# Patient Record
Sex: FEMALE | Race: WHITE | NOT HISPANIC OR LATINO | Employment: FULL TIME | URBAN - METROPOLITAN AREA
[De-identification: names, ages, dates, MRNs, and addresses within clinical notes are randomized per-mention and may not be internally consistent; named-entity substitution may affect disease eponyms.]

---

## 2017-09-12 ENCOUNTER — LAB VISIT (OUTPATIENT)
Dept: LAB | Facility: HOSPITAL | Age: 47
End: 2017-09-12
Attending: INTERNAL MEDICINE
Payer: COMMERCIAL

## 2017-09-12 DIAGNOSIS — R10.9 FLANK PAIN: ICD-10-CM

## 2017-09-12 DIAGNOSIS — R10.32 LEFT LOWER QUADRANT PAIN: ICD-10-CM

## 2017-09-12 DIAGNOSIS — R10.32 LEFT LOWER QUADRANT PAIN: Primary | ICD-10-CM

## 2017-09-12 LAB
ANION GAP SERPL CALC-SCNC: 9 MMOL/L
B-HCG UR QL: NEGATIVE
BUN SERPL-MCNC: 17 MG/DL
CALCIUM SERPL-MCNC: 9.7 MG/DL
CHLORIDE SERPL-SCNC: 103 MMOL/L
CO2 SERPL-SCNC: 29 MMOL/L
CREAT SERPL-MCNC: 0.8 MG/DL
EST. GFR  (AFRICAN AMERICAN): >60 ML/MIN/1.73 M^2
EST. GFR  (NON AFRICAN AMERICAN): >60 ML/MIN/1.73 M^2
GLUCOSE SERPL-MCNC: 100 MG/DL
POTASSIUM SERPL-SCNC: 3.7 MMOL/L
SODIUM SERPL-SCNC: 141 MMOL/L

## 2017-09-12 PROCEDURE — 80048 BASIC METABOLIC PNL TOTAL CA: CPT

## 2017-09-12 PROCEDURE — 36415 COLL VENOUS BLD VENIPUNCTURE: CPT

## 2017-09-12 PROCEDURE — 81025 URINE PREGNANCY TEST: CPT

## 2017-09-13 ENCOUNTER — HOSPITAL ENCOUNTER (OUTPATIENT)
Dept: RADIOLOGY | Facility: HOSPITAL | Age: 47
Discharge: HOME | End: 2017-09-13
Attending: INTERNAL MEDICINE
Payer: COMMERCIAL

## 2017-09-13 DIAGNOSIS — R10.32 LEFT LOWER QUADRANT PAIN: ICD-10-CM

## 2017-09-13 DIAGNOSIS — R10.9 FLANK PAIN: ICD-10-CM

## 2017-09-13 PROCEDURE — 74177 CT ABD & PELVIS W/CONTRAST: CPT | Mod: 26,,, | Performed by: RADIOLOGY

## 2017-09-13 PROCEDURE — 25500020 PHARM REV CODE 255: Performed by: INTERNAL MEDICINE

## 2017-09-13 PROCEDURE — 74177 CT ABD & PELVIS W/CONTRAST: CPT | Mod: TC

## 2017-09-13 RX ADMIN — IOHEXOL 100 ML: 350 INJECTION, SOLUTION INTRAVENOUS at 09:09

## 2017-09-13 RX ADMIN — IOHEXOL 15 ML: 300 INJECTION, SOLUTION INTRAVENOUS at 09:09

## 2017-09-15 DIAGNOSIS — Z12.31 VISIT FOR SCREENING MAMMOGRAM: Primary | ICD-10-CM

## 2017-09-21 ENCOUNTER — HOSPITAL ENCOUNTER (OUTPATIENT)
Dept: RADIOLOGY | Facility: HOSPITAL | Age: 47
Discharge: HOME | End: 2017-09-21
Attending: OBSTETRICS & GYNECOLOGY
Payer: COMMERCIAL

## 2017-09-21 DIAGNOSIS — Z12.31 VISIT FOR SCREENING MAMMOGRAM: ICD-10-CM

## 2017-09-21 PROCEDURE — 77067 SCR MAMMO BI INCL CAD: CPT | Mod: TC

## 2017-09-21 PROCEDURE — 77067 SCR MAMMO BI INCL CAD: CPT | Mod: 26,,, | Performed by: RADIOLOGY

## 2017-09-21 PROCEDURE — 77063 BREAST TOMOSYNTHESIS BI: CPT | Mod: 26,,, | Performed by: RADIOLOGY

## 2017-09-25 DIAGNOSIS — I10 BENIGN HYPERTENSION: ICD-10-CM

## 2017-09-25 DIAGNOSIS — E03.4 ATROPHY OF THYROID: Primary | ICD-10-CM

## 2017-09-26 ENCOUNTER — LAB VISIT (OUTPATIENT)
Dept: LAB | Facility: HOSPITAL | Age: 47
End: 2017-09-26
Attending: INTERNAL MEDICINE
Payer: COMMERCIAL

## 2017-09-26 DIAGNOSIS — E03.4 ATROPHY OF THYROID: ICD-10-CM

## 2017-09-26 DIAGNOSIS — I10 BENIGN HYPERTENSION: ICD-10-CM

## 2017-09-26 LAB
ALBUMIN SERPL BCP-MCNC: 3.4 G/DL
ALP SERPL-CCNC: 73 U/L
ALT SERPL W/O P-5'-P-CCNC: 58 U/L
ANION GAP SERPL CALC-SCNC: 8 MMOL/L
AST SERPL-CCNC: 26 U/L
BASOPHILS # BLD AUTO: 0.03 K/UL
BASOPHILS NFR BLD: 0.4 %
BILIRUB SERPL-MCNC: 0.4 MG/DL
BUN SERPL-MCNC: 14 MG/DL
CALCIUM SERPL-MCNC: 9.6 MG/DL
CHLORIDE SERPL-SCNC: 107 MMOL/L
CHOLEST SERPL-MCNC: 174 MG/DL
CHOLEST/HDLC SERPL: 4.1 {RATIO}
CO2 SERPL-SCNC: 24 MMOL/L
CREAT SERPL-MCNC: 0.8 MG/DL
DIFFERENTIAL METHOD: NORMAL
EOSINOPHIL # BLD AUTO: 0.1 K/UL
EOSINOPHIL NFR BLD: 1.9 %
ERYTHROCYTE [DISTWIDTH] IN BLOOD BY AUTOMATED COUNT: 12.7 %
EST. GFR  (AFRICAN AMERICAN): >60 ML/MIN/1.73 M^2
EST. GFR  (NON AFRICAN AMERICAN): >60 ML/MIN/1.73 M^2
GLUCOSE SERPL-MCNC: 89 MG/DL
HCT VFR BLD AUTO: 38 %
HDLC SERPL-MCNC: 42 MG/DL
HDLC SERPL: 24.1 %
HGB BLD-MCNC: 12.8 G/DL
LDLC SERPL CALC-MCNC: 94 MG/DL
LYMPHOCYTES # BLD AUTO: 2.6 K/UL
LYMPHOCYTES NFR BLD: 37.2 %
MCH RBC QN AUTO: 30 PG
MCHC RBC AUTO-ENTMCNC: 33.7 G/DL
MCV RBC AUTO: 89 FL
MONOCYTES # BLD AUTO: 0.5 K/UL
MONOCYTES NFR BLD: 7.6 %
NEUTROPHILS # BLD AUTO: 3.7 K/UL
NEUTROPHILS NFR BLD: 52.8 %
NONHDLC SERPL-MCNC: 132 MG/DL
PLATELET # BLD AUTO: 245 K/UL
PMV BLD AUTO: 11.8 FL
POTASSIUM SERPL-SCNC: 4.2 MMOL/L
PROT SERPL-MCNC: 7.1 G/DL
RBC # BLD AUTO: 4.26 M/UL
SODIUM SERPL-SCNC: 139 MMOL/L
T4 FREE SERPL-MCNC: 1.37 NG/DL
TRIGL SERPL-MCNC: 190 MG/DL
TSH SERPL DL<=0.005 MIU/L-ACNC: 0.53 UIU/ML
WBC # BLD AUTO: 6.94 K/UL

## 2017-09-26 PROCEDURE — 85025 COMPLETE CBC W/AUTO DIFF WBC: CPT

## 2017-09-26 PROCEDURE — 84443 ASSAY THYROID STIM HORMONE: CPT

## 2017-09-26 PROCEDURE — 80053 COMPREHEN METABOLIC PANEL: CPT

## 2017-09-26 PROCEDURE — 84439 ASSAY OF FREE THYROXINE: CPT

## 2017-09-26 PROCEDURE — 36415 COLL VENOUS BLD VENIPUNCTURE: CPT

## 2017-09-26 PROCEDURE — 80061 LIPID PANEL: CPT

## 2018-07-18 ENCOUNTER — PATIENT MESSAGE (OUTPATIENT)
Dept: ADMINISTRATIVE | Facility: OTHER | Age: 48
End: 2018-07-18

## 2018-09-05 ENCOUNTER — TELEPHONE (OUTPATIENT)
Dept: OTOLARYNGOLOGY | Facility: CLINIC | Age: 48
End: 2018-09-05

## 2018-09-05 NOTE — TELEPHONE ENCOUNTER
----- Message from Sade Hurd sent at 9/5/2018  1:04 PM CDT -----  Name of Who is Calling: WANG DEXTER [[id number]]      What is the request in detail: Pt states she cannot connect to the Dr In my chart. She states she will need the prescription for her needles.       Can the clinic reply by [facility]:  Yes       What Number to Call Back [facility]: [phone]

## 2018-09-11 ENCOUNTER — TELEPHONE (OUTPATIENT)
Dept: OTOLARYNGOLOGY | Facility: CLINIC | Age: 48
End: 2018-09-11

## 2018-09-14 DIAGNOSIS — I10 BENIGN HYPERTENSION: Primary | ICD-10-CM

## 2018-09-20 ENCOUNTER — LAB VISIT (OUTPATIENT)
Dept: LAB | Facility: HOSPITAL | Age: 48
End: 2018-09-20
Attending: INTERNAL MEDICINE
Payer: COMMERCIAL

## 2018-09-20 DIAGNOSIS — I10 BENIGN HYPERTENSION: ICD-10-CM

## 2018-09-20 LAB
25(OH)D3+25(OH)D2 SERPL-MCNC: 53 NG/ML
ALBUMIN SERPL BCP-MCNC: 3.6 G/DL
ALP SERPL-CCNC: 65 U/L
ALT SERPL W/O P-5'-P-CCNC: 13 U/L
ANION GAP SERPL CALC-SCNC: 10 MMOL/L
AST SERPL-CCNC: 11 U/L
BASOPHILS # BLD AUTO: 0.02 K/UL
BASOPHILS NFR BLD: 0.4 %
BILIRUB SERPL-MCNC: 0.6 MG/DL
BUN SERPL-MCNC: 18 MG/DL
CALCIUM SERPL-MCNC: 9.8 MG/DL
CHLORIDE SERPL-SCNC: 108 MMOL/L
CHOLEST SERPL-MCNC: 197 MG/DL
CHOLEST/HDLC SERPL: 3.7 {RATIO}
CO2 SERPL-SCNC: 21 MMOL/L
CREAT SERPL-MCNC: 0.9 MG/DL
DIFFERENTIAL METHOD: NORMAL
EOSINOPHIL # BLD AUTO: 0.1 K/UL
EOSINOPHIL NFR BLD: 2.1 %
ERYTHROCYTE [DISTWIDTH] IN BLOOD BY AUTOMATED COUNT: 12.6 %
EST. GFR  (AFRICAN AMERICAN): >60 ML/MIN/1.73 M^2
EST. GFR  (NON AFRICAN AMERICAN): >60 ML/MIN/1.73 M^2
GLUCOSE SERPL-MCNC: 105 MG/DL
HCT VFR BLD AUTO: 38 %
HDLC SERPL-MCNC: 53 MG/DL
HDLC SERPL: 26.9 %
HGB BLD-MCNC: 13.2 G/DL
LDLC SERPL CALC-MCNC: 118.8 MG/DL
LYMPHOCYTES # BLD AUTO: 1.6 K/UL
LYMPHOCYTES NFR BLD: 33.6 %
MCH RBC QN AUTO: 30 PG
MCHC RBC AUTO-ENTMCNC: 34.7 G/DL
MCV RBC AUTO: 86 FL
MONOCYTES # BLD AUTO: 0.4 K/UL
MONOCYTES NFR BLD: 8.8 %
NEUTROPHILS # BLD AUTO: 2.6 K/UL
NEUTROPHILS NFR BLD: 54.9 %
NONHDLC SERPL-MCNC: 144 MG/DL
PLATELET # BLD AUTO: 289 K/UL
PMV BLD AUTO: 9.5 FL
POTASSIUM SERPL-SCNC: 4.4 MMOL/L
PROT SERPL-MCNC: 7.2 G/DL
RBC # BLD AUTO: 4.4 M/UL
SODIUM SERPL-SCNC: 139 MMOL/L
T4 FREE SERPL-MCNC: 1.38 NG/DL
TRIGL SERPL-MCNC: 126 MG/DL
TSH SERPL DL<=0.005 MIU/L-ACNC: 1.62 UIU/ML
WBC # BLD AUTO: 4.79 K/UL

## 2018-09-20 PROCEDURE — 85025 COMPLETE CBC W/AUTO DIFF WBC: CPT

## 2018-09-20 PROCEDURE — 82306 VITAMIN D 25 HYDROXY: CPT

## 2018-09-20 PROCEDURE — 36415 COLL VENOUS BLD VENIPUNCTURE: CPT

## 2018-09-20 PROCEDURE — 84443 ASSAY THYROID STIM HORMONE: CPT

## 2018-09-20 PROCEDURE — 80061 LIPID PANEL: CPT

## 2018-09-20 PROCEDURE — 84439 ASSAY OF FREE THYROXINE: CPT

## 2018-09-20 PROCEDURE — 80053 COMPREHEN METABOLIC PANEL: CPT

## 2018-09-27 ENCOUNTER — HOSPITAL ENCOUNTER (OUTPATIENT)
Dept: RADIOLOGY | Facility: HOSPITAL | Age: 48
Discharge: HOME | End: 2018-09-27
Attending: OBSTETRICS & GYNECOLOGY
Payer: COMMERCIAL

## 2018-09-27 DIAGNOSIS — Z12.31 SCREENING MAMMOGRAM, ENCOUNTER FOR: ICD-10-CM

## 2018-09-27 PROCEDURE — 77067 SCR MAMMO BI INCL CAD: CPT | Mod: 26,,, | Performed by: RADIOLOGY

## 2018-09-27 PROCEDURE — 77063 BREAST TOMOSYNTHESIS BI: CPT | Mod: 26,,, | Performed by: RADIOLOGY

## 2018-09-27 PROCEDURE — 77067 SCR MAMMO BI INCL CAD: CPT | Mod: TC

## 2019-03-11 ENCOUNTER — TELEPHONE (OUTPATIENT)
Dept: OTOLARYNGOLOGY | Facility: CLINIC | Age: 49
End: 2019-03-11

## 2019-03-13 ENCOUNTER — TELEPHONE (OUTPATIENT)
Dept: OTOLARYNGOLOGY | Facility: CLINIC | Age: 49
End: 2019-03-13

## 2019-03-13 NOTE — TELEPHONE ENCOUNTER
Spoke with patient regarding allergy program and referred her to Dr Delmi Charles at [facility]. Patient will decide to make an appointment on her own when ready with Dr Charles or another Allergist. Patient will also  a copy of her past allergy test from our office.

## 2019-05-01 ENCOUNTER — HOSPITAL ENCOUNTER (OUTPATIENT)
Dept: RADIOLOGY | Facility: HOSPITAL | Age: 49
Discharge: HOME | End: 2019-05-01
Attending: OBSTETRICS & GYNECOLOGY
Payer: COMMERCIAL

## 2019-05-01 DIAGNOSIS — N92.0 MENORRHAGIA WITH REGULAR CYCLE: ICD-10-CM

## 2019-05-01 PROCEDURE — 76856 US PELVIS COMP WITH TRANSVAG NON-OB (XPD): ICD-10-PCS | Mod: 26,,, | Performed by: RADIOLOGY

## 2019-05-01 PROCEDURE — 76830 TRANSVAGINAL US NON-OB: CPT | Mod: TC

## 2019-05-01 PROCEDURE — 76830 US PELVIS COMP WITH TRANSVAG NON-OB (XPD): ICD-10-PCS | Mod: 26,,, | Performed by: RADIOLOGY

## 2019-05-01 PROCEDURE — 76856 US EXAM PELVIC COMPLETE: CPT | Mod: 26,,, | Performed by: RADIOLOGY

## 2019-05-01 PROCEDURE — 76830 TRANSVAGINAL US NON-OB: CPT | Mod: 26,,, | Performed by: RADIOLOGY

## 2019-05-23 PROBLEM — R10.32 LEFT LOWER QUADRANT PAIN: Status: RESOLVED | Noted: 2017-09-12 | Resolved: 2019-05-23

## 2019-10-02 ENCOUNTER — HOSPITAL ENCOUNTER (OUTPATIENT)
Dept: RADIOLOGY | Facility: HOSPITAL | Age: 49
Discharge: HOME | End: 2019-10-02
Attending: OBSTETRICS & GYNECOLOGY
Payer: COMMERCIAL

## 2019-10-02 VITALS — WEIGHT: 167.13 LBS | HEIGHT: 66 IN | BODY MASS INDEX: 26.86 KG/M2

## 2019-10-02 DIAGNOSIS — Z12.31 SCREENING MAMMOGRAM, ENCOUNTER FOR: ICD-10-CM

## 2019-10-02 PROCEDURE — 77067 SCR MAMMO BI INCL CAD: CPT | Mod: 26,,, | Performed by: RADIOLOGY

## 2019-10-02 PROCEDURE — 77067 MAMMO DIGITAL SCREENING BILAT WITH TOMOSYNTHESIS_CAD: ICD-10-PCS | Mod: 26,,, | Performed by: RADIOLOGY

## 2019-10-02 PROCEDURE — 77063 BREAST TOMOSYNTHESIS BI: CPT | Mod: 26,,, | Performed by: RADIOLOGY

## 2019-10-02 PROCEDURE — 77067 SCR MAMMO BI INCL CAD: CPT | Mod: TC

## 2019-10-02 PROCEDURE — 77063 MAMMO DIGITAL SCREENING BILAT WITH TOMOSYNTHESIS_CAD: ICD-10-PCS | Mod: 26,,, | Performed by: RADIOLOGY

## 2020-02-18 DIAGNOSIS — E03.4 ATROPHY OF THYROID: Primary | ICD-10-CM

## 2020-02-18 DIAGNOSIS — I10 BENIGN HYPERTENSION: ICD-10-CM

## 2020-02-21 ENCOUNTER — LAB VISIT (OUTPATIENT)
Dept: LAB | Facility: HOSPITAL | Age: 50
End: 2020-02-21
Attending: INTERNAL MEDICINE
Payer: COMMERCIAL

## 2020-02-21 DIAGNOSIS — I10 BENIGN HYPERTENSION: ICD-10-CM

## 2020-02-21 DIAGNOSIS — E03.4 ATROPHY OF THYROID: ICD-10-CM

## 2020-02-21 LAB
ALBUMIN SERPL BCP-MCNC: 3.7 G/DL (ref 3.5–5.2)
ALP SERPL-CCNC: 91 U/L (ref 55–135)
ALT SERPL W/O P-5'-P-CCNC: 12 U/L (ref 10–44)
ANION GAP SERPL CALC-SCNC: 7 MMOL/L (ref 8–16)
AST SERPL-CCNC: 13 U/L (ref 10–40)
BASOPHILS # BLD AUTO: 0.03 K/UL (ref 0–0.2)
BASOPHILS NFR BLD: 0.6 % (ref 0–1.9)
BILIRUB SERPL-MCNC: 0.6 MG/DL (ref 0.1–1)
BUN SERPL-MCNC: 12 MG/DL (ref 6–20)
CALCIUM SERPL-MCNC: 9.4 MG/DL (ref 8.7–10.5)
CHLORIDE SERPL-SCNC: 108 MMOL/L (ref 95–110)
CHOLEST SERPL-MCNC: 191 MG/DL (ref 120–199)
CHOLEST/HDLC SERPL: 3.5 {RATIO} (ref 2–5)
CO2 SERPL-SCNC: 25 MMOL/L (ref 23–29)
CREAT SERPL-MCNC: 0.8 MG/DL (ref 0.5–1.4)
DIFFERENTIAL METHOD: ABNORMAL
EOSINOPHIL # BLD AUTO: 0.1 K/UL (ref 0–0.5)
EOSINOPHIL NFR BLD: 1.9 % (ref 0–8)
ERYTHROCYTE [DISTWIDTH] IN BLOOD BY AUTOMATED COUNT: 12.5 % (ref 11.5–14.5)
EST. GFR  (AFRICAN AMERICAN): >60 ML/MIN/1.73 M^2
EST. GFR  (NON AFRICAN AMERICAN): >60 ML/MIN/1.73 M^2
GLUCOSE SERPL-MCNC: 99 MG/DL (ref 70–110)
HCT VFR BLD AUTO: 44.1 % (ref 37–48.5)
HDLC SERPL-MCNC: 54 MG/DL (ref 40–75)
HDLC SERPL: 28.3 % (ref 20–50)
HGB BLD-MCNC: 14.2 G/DL (ref 12–16)
IMM GRANULOCYTES # BLD AUTO: 0.03 K/UL (ref 0–0.04)
IMM GRANULOCYTES NFR BLD AUTO: 0.6 % (ref 0–0.5)
LDLC SERPL CALC-MCNC: 125.6 MG/DL (ref 63–159)
LYMPHOCYTES # BLD AUTO: 1.6 K/UL (ref 1–4.8)
LYMPHOCYTES NFR BLD: 34.3 % (ref 18–48)
MCH RBC QN AUTO: 30 PG (ref 27–31)
MCHC RBC AUTO-ENTMCNC: 32.2 G/DL (ref 32–36)
MCV RBC AUTO: 93 FL (ref 82–98)
MONOCYTES # BLD AUTO: 0.4 K/UL (ref 0.3–1)
MONOCYTES NFR BLD: 7.9 % (ref 4–15)
NEUTROPHILS # BLD AUTO: 2.6 K/UL (ref 1.8–7.7)
NEUTROPHILS NFR BLD: 54.7 % (ref 38–73)
NONHDLC SERPL-MCNC: 137 MG/DL
NRBC BLD-RTO: 0 /100 WBC
PLATELET # BLD AUTO: 254 K/UL (ref 150–350)
PMV BLD AUTO: 9.6 FL (ref 9.2–12.9)
POTASSIUM SERPL-SCNC: 4.4 MMOL/L (ref 3.5–5.1)
PROT SERPL-MCNC: 6.8 G/DL (ref 6–8.4)
RBC # BLD AUTO: 4.74 M/UL (ref 4–5.4)
SODIUM SERPL-SCNC: 140 MMOL/L (ref 136–145)
T4 FREE SERPL-MCNC: 0.89 NG/DL (ref 0.71–1.51)
TRIGL SERPL-MCNC: 57 MG/DL (ref 30–150)
TSH SERPL DL<=0.005 MIU/L-ACNC: 4.31 UIU/ML (ref 0.4–4)
WBC # BLD AUTO: 4.69 K/UL (ref 3.9–12.7)

## 2020-02-21 PROCEDURE — 36415 COLL VENOUS BLD VENIPUNCTURE: CPT

## 2020-02-21 PROCEDURE — 84443 ASSAY THYROID STIM HORMONE: CPT

## 2020-02-21 PROCEDURE — 80061 LIPID PANEL: CPT

## 2020-02-21 PROCEDURE — 85025 COMPLETE CBC W/AUTO DIFF WBC: CPT

## 2020-02-21 PROCEDURE — 80053 COMPREHEN METABOLIC PANEL: CPT

## 2020-02-21 PROCEDURE — 84439 ASSAY OF FREE THYROXINE: CPT

## 2020-03-25 ENCOUNTER — OFFICE VISIT (OUTPATIENT)
Dept: OBSTETRICS AND GYNECOLOGY | Facility: CLINIC | Age: 50
End: 2020-03-25
Payer: COMMERCIAL

## 2020-03-25 VITALS
DIASTOLIC BLOOD PRESSURE: 84 MMHG | BODY MASS INDEX: 26.72 KG/M2 | SYSTOLIC BLOOD PRESSURE: 120 MMHG | WEIGHT: 166.25 LBS | HEIGHT: 66 IN

## 2020-03-25 DIAGNOSIS — Z12.11 ENCOUNTER FOR COLORECTAL CANCER SCREENING: ICD-10-CM

## 2020-03-25 DIAGNOSIS — N92.1 BREAKTHROUGH BLEEDING WITH IUD: ICD-10-CM

## 2020-03-25 DIAGNOSIS — Z97.5 BREAKTHROUGH BLEEDING WITH IUD: ICD-10-CM

## 2020-03-25 DIAGNOSIS — Z01.419 WELL WOMAN EXAM WITH ROUTINE GYNECOLOGICAL EXAM: Primary | ICD-10-CM

## 2020-03-25 DIAGNOSIS — Z12.12 ENCOUNTER FOR COLORECTAL CANCER SCREENING: ICD-10-CM

## 2020-03-25 PROCEDURE — 3074F SYST BP LT 130 MM HG: CPT | Mod: CPTII,S$GLB,, | Performed by: OBSTETRICS & GYNECOLOGY

## 2020-03-25 PROCEDURE — 3079F PR MOST RECENT DIASTOLIC BLOOD PRESSURE 80-89 MM HG: ICD-10-PCS | Mod: CPTII,S$GLB,, | Performed by: OBSTETRICS & GYNECOLOGY

## 2020-03-25 PROCEDURE — 3074F PR MOST RECENT SYSTOLIC BLOOD PRESSURE < 130 MM HG: ICD-10-PCS | Mod: CPTII,S$GLB,, | Performed by: OBSTETRICS & GYNECOLOGY

## 2020-03-25 PROCEDURE — 99999 PR PBB SHADOW E&M-EST. PATIENT-LVL III: ICD-10-PCS | Mod: PBBFAC,,, | Performed by: OBSTETRICS & GYNECOLOGY

## 2020-03-25 PROCEDURE — 3079F DIAST BP 80-89 MM HG: CPT | Mod: CPTII,S$GLB,, | Performed by: OBSTETRICS & GYNECOLOGY

## 2020-03-25 PROCEDURE — 99999 PR PBB SHADOW E&M-EST. PATIENT-LVL III: CPT | Mod: PBBFAC,,, | Performed by: OBSTETRICS & GYNECOLOGY

## 2020-03-25 PROCEDURE — 99386 PREV VISIT NEW AGE 40-64: CPT | Mod: S$GLB,,, | Performed by: OBSTETRICS & GYNECOLOGY

## 2020-03-25 PROCEDURE — 99386 PR PREVENTIVE VISIT,NEW,40-64: ICD-10-PCS | Mod: S$GLB,,, | Performed by: OBSTETRICS & GYNECOLOGY

## 2020-03-25 RX ORDER — ESTRADIOL 1 MG/1
1 TABLET ORAL DAILY
Qty: 30 TABLET | Refills: 2 | Status: SHIPPED | OUTPATIENT
Start: 2020-03-25 | End: 2020-07-28 | Stop reason: SDUPTHER

## 2020-03-25 NOTE — PROGRESS NOTES
SUBJECTIVE:   Faith Johnson is a 50 y.o. female   for annual well woman exam. Patient's last menstrual period was 2019 (exact date)..    Pt reported with vaginal bleeding as below    Contraception: mirena iud since 2019  Amenorrheic from 2019 until 2020, since then had everyday with spotting dark to bright red blood, having to wear panty liners daily  Changed 1-2 panty liners with appx less 50% of saturation  Denies pain with sex    Past Medical History:   Diagnosis Date    Hypertension     Hypothyroidism     Mitral valve prolapse      Past Surgical History:   Procedure Laterality Date     SECTION      CHOLECYSTECTOMY      plantar fasciitis       Social History     Socioeconomic History    Marital status:      Spouse name: Not on file    Number of children: Not on file    Years of education: Not on file    Highest education level: Not on file   Occupational History    Not on file   Social Needs    Financial resource strain: Not on file    Food insecurity:     Worry: Not on file     Inability: Not on file    Transportation needs:     Medical: Not on file     Non-medical: Not on file   Tobacco Use    Smoking status: Never Smoker    Smokeless tobacco: Never Used   Substance and Sexual Activity    Alcohol use: Yes     Comment: rare    Drug use: No    Sexual activity: Not Currently     Partners: Male     Birth control/protection: IUD   Lifestyle    Physical activity:     Days per week: Not on file     Minutes per session: Not on file    Stress: Not on file   Relationships    Social connections:     Talks on phone: Not on file     Gets together: Not on file     Attends [employer] service: Not on file     Active member of club or organization: Not on file     Attends meetings of clubs or organizations: Not on file     Relationship status: Not on file   Other Topics Concern    Not on file   Social History Narrative    Not on file     Family History   Problem  Relation Age of Onset    Breast cancer Neg Hx     Colon cancer Neg Hx     Ovarian cancer Neg Hx      OB History    Para Term  AB Living   1 1 1         SAB TAB Ectopic Multiple Live Births                  # Outcome Date GA Lbr Lefty/2nd Weight Sex Delivery Anes PTL Lv   1 Term               Obstetric Comments   Gynhx: reg/heavy   mirena iud placed 2019   Denies abnl pap, Pap 2019 neg   MMG 10/2019         Current Outpatient Medications   Medication Sig Dispense Refill    drospirenone-ethinyl estradiol (OCELLA) 3-0.03 mg per tablet 1 tablet po x 3 week, skip placebo row and start new pack 84 tablet 4    levocetirizine (XYZAL) 5 MG tablet Take 1 tablet (5 mg total) by mouth every evening. 30 tablet 5    levothyroxine (SYNTHROID) 100 MCG tablet Take 100 mcg by mouth once daily.  0    montelukast (SINGULAIR) 10 mg tablet Take 10 mg by mouth every evening.         Current Facility-Administered Medications   Medication Dose Route Frequency Provider Last Rate Last Dose    levonorgestrel 20 mcg/24 hours (5 yrs) 52 mg IUD 1 Intra Uterine Device  1 Intra Uterine Device Intrauterine  Ashanti Bee MD   1 Intra Uterine Device at 19 1527     Allergies: Codeine       ROS:  GENERAL: Denies weight gain or weight loss. Feeling well overall.   SKIN: Denies rash or lesions.   HEAD: Denies head injury or headache.   NODES: Denies enlarged lymph nodes.   CHEST: Denies chest pain or shortness of breath.   CARDIOVASCULAR: Denies palpitations or left sided chest pain.   ABDOMEN: No abdominal pain, constipation, diarrhea, nausea, vomiting or rectal bleeding.   URINARY: No frequency, dysuria, hematuria, or burning on urination.  REPRODUCTIVE: Denies vaginal discharge, abnormal vaginal bleeding, lesions, pelvic pain  BREASTS: The patient performs breast self-examination and denies pain, lumps, or nipple discharge.   HEMATOLOGIC: No easy bruisability or excessive bleeding.  MUSCULOSKELETAL: Denies joint  "pain or swelling.   NEUROLOGIC: Denies syncope or weakness.   PSYCHIATRIC: Denies depression, anxiety or mood swings.      OBJECTIVE:   /84 (BP Location: Left arm, Patient Position: Sitting, BP Method: Medium (Manual))   Ht 5' 6" (1.676 m)   Wt 75.4 kg (166 lb 3.6 oz)   LMP 05/24/2019 (Exact Date) Comment: mirena  BMI 26.83 kg/m²   The patient appears well, alert, oriented x 3, in no distress.  PSYCH:  Normal, full range of affect  NECK: negative, no thyromegaly, trachea midline  SKIN: normal, good color, good turgor and no acne, striae, hirsutism  BREAST EXAM: breasts appear normal, no suspicious masses, no skin or nipple changes or axillary nodes  ABDOMEN: soft, non-tender; bowel sounds normal; no masses,  no organomegaly and no hernias, masses, or hepatosplenomegaly  GENITALIA: normal external genitalia, no erythema, no discharge  BLADDER: soft  URETHRA: normal appearing urethra with no masses, tenderness or lesions and normal urethra, normal urethral meatus  VAGINA: Normal, scant dark blood noted in vaginal vault  CERVIX: no lesions or cervical motion tenderness, iud strings about 1 cm long  UTERUS: normal size, contour, position, consistency, mobility, non-tender  ADNEXA: no mass, fullness, tenderness      ASSESSMENT:   1. Health maintenance  -pap UTD  -screening MMG UTD  -colonoscopy ordered  -counseled on exercise and healthy diet, weight loss  -bone health:  Discussed Vitamin D and Calcium supplementation, weight bearing exercises  2.  Discussed safety at home/school/work, healthy and balanced diet, sleep hygiene, as well as violence/weapons exposure.   3.  BTB on mirena IUD:  rx for estradiol x 1 month    "

## 2020-04-21 DIAGNOSIS — Z01.84 ANTIBODY RESPONSE EXAMINATION: ICD-10-CM

## 2020-04-22 ENCOUNTER — LAB VISIT (OUTPATIENT)
Dept: LAB | Facility: HOSPITAL | Age: 50
End: 2020-04-22
Attending: INTERNAL MEDICINE
Payer: COMMERCIAL

## 2020-04-22 DIAGNOSIS — Z01.84 ANTIBODY RESPONSE EXAMINATION: ICD-10-CM

## 2020-04-22 LAB — SARS-COV-2 IGG SERPL QL IA: NEGATIVE

## 2020-04-22 PROCEDURE — 86769 SARS-COV-2 COVID-19 ANTIBODY: CPT

## 2020-04-22 PROCEDURE — 36415 COLL VENOUS BLD VENIPUNCTURE: CPT

## 2020-07-27 ENCOUNTER — PATIENT MESSAGE (OUTPATIENT)
Dept: OBSTETRICS AND GYNECOLOGY | Facility: CLINIC | Age: 50
End: 2020-07-27

## 2020-07-28 RX ORDER — ESTRADIOL 1 MG/1
1 TABLET ORAL DAILY
Qty: 30 TABLET | Refills: 1 | Status: SHIPPED | OUTPATIENT
Start: 2020-07-28 | End: 2020-09-21

## 2020-10-22 ENCOUNTER — PATIENT MESSAGE (OUTPATIENT)
Dept: OBSTETRICS AND GYNECOLOGY | Facility: CLINIC | Age: 50
End: 2020-10-22

## 2020-10-22 DIAGNOSIS — Z12.31 BREAST CANCER SCREENING BY MAMMOGRAM: Primary | ICD-10-CM

## 2020-11-06 ENCOUNTER — HOSPITAL ENCOUNTER (OUTPATIENT)
Dept: RADIOLOGY | Facility: HOSPITAL | Age: 50
Discharge: HOME | End: 2020-11-06
Attending: OBSTETRICS & GYNECOLOGY
Payer: COMMERCIAL

## 2020-11-06 VITALS — BODY MASS INDEX: 26.72 KG/M2 | HEIGHT: 66 IN | WEIGHT: 166.25 LBS

## 2020-11-06 DIAGNOSIS — Z12.31 BREAST CANCER SCREENING BY MAMMOGRAM: ICD-10-CM

## 2020-11-06 PROCEDURE — 77067 SCR MAMMO BI INCL CAD: CPT | Mod: TC

## 2020-11-06 PROCEDURE — 77063 BREAST TOMOSYNTHESIS BI: CPT | Mod: 26,,, | Performed by: RADIOLOGY

## 2020-11-06 PROCEDURE — 77067 SCR MAMMO BI INCL CAD: CPT | Mod: 26,,, | Performed by: RADIOLOGY

## 2020-11-06 PROCEDURE — 77063 MAMMO DIGITAL SCREENING BILAT WITH TOMO: ICD-10-PCS | Mod: 26,,, | Performed by: RADIOLOGY

## 2020-11-06 PROCEDURE — 77067 MAMMO DIGITAL SCREENING BILAT WITH TOMO: ICD-10-PCS | Mod: 26,,, | Performed by: RADIOLOGY

## 2020-12-14 ENCOUNTER — PATIENT MESSAGE (OUTPATIENT)
Dept: OBSTETRICS AND GYNECOLOGY | Facility: CLINIC | Age: 50
End: 2020-12-14

## 2020-12-14 DIAGNOSIS — N93.9 ABNORMAL UTERINE BLEEDING (AUB): Primary | ICD-10-CM

## 2020-12-15 ENCOUNTER — PATIENT MESSAGE (OUTPATIENT)
Dept: OBSTETRICS AND GYNECOLOGY | Facility: CLINIC | Age: 50
End: 2020-12-15

## 2020-12-29 ENCOUNTER — IMMUNIZATION (OUTPATIENT)
Dept: OBSTETRICS AND GYNECOLOGY | Facility: CLINIC | Age: 50
End: 2020-12-29
Payer: COMMERCIAL

## 2020-12-29 DIAGNOSIS — Z23 NEED FOR VACCINATION: ICD-10-CM

## 2020-12-29 PROCEDURE — 91300 COVID-19, MRNA, LNP-S, PF, 30 MCG/0.3 ML DOSE VACCINE: ICD-10-PCS | Mod: ,,, | Performed by: FAMILY MEDICINE

## 2020-12-29 PROCEDURE — 0001A COVID-19, MRNA, LNP-S, PF, 30 MCG/0.3 ML DOSE VACCINE: ICD-10-PCS | Mod: CV19,,, | Performed by: FAMILY MEDICINE

## 2020-12-29 PROCEDURE — 91300 COVID-19, MRNA, LNP-S, PF, 30 MCG/0.3 ML DOSE VACCINE: CPT | Mod: ,,, | Performed by: FAMILY MEDICINE

## 2020-12-29 PROCEDURE — 0001A COVID-19, MRNA, LNP-S, PF, 30 MCG/0.3 ML DOSE VACCINE: CPT | Mod: CV19,,, | Performed by: FAMILY MEDICINE

## 2021-01-19 ENCOUNTER — IMMUNIZATION (OUTPATIENT)
Dept: OBSTETRICS AND GYNECOLOGY | Facility: CLINIC | Age: 51
End: 2021-01-19
Payer: COMMERCIAL

## 2021-01-19 DIAGNOSIS — Z23 NEED FOR VACCINATION: Primary | ICD-10-CM

## 2021-01-19 PROCEDURE — 91300 COVID-19, MRNA, LNP-S, PF, 30 MCG/0.3 ML DOSE VACCINE: CPT | Mod: PBBFAC | Performed by: FAMILY MEDICINE

## 2021-01-19 PROCEDURE — 0002A COVID-19, MRNA, LNP-S, PF, 30 MCG/0.3 ML DOSE VACCINE: CPT | Mod: PBBFAC | Performed by: FAMILY MEDICINE

## 2021-02-02 ENCOUNTER — PATIENT MESSAGE (OUTPATIENT)
Dept: OBSTETRICS AND GYNECOLOGY | Facility: CLINIC | Age: 51
End: 2021-02-02

## 2021-02-09 ENCOUNTER — PATIENT MESSAGE (OUTPATIENT)
Dept: OBSTETRICS AND GYNECOLOGY | Facility: CLINIC | Age: 51
End: 2021-02-09

## 2021-02-09 RX ORDER — ESTRADIOL 1 MG/1
1 TABLET ORAL DAILY
Qty: 90 TABLET | Refills: 3 | Status: SHIPPED | OUTPATIENT
Start: 2021-02-09 | End: 2022-03-28 | Stop reason: SDUPTHER

## 2021-05-04 ENCOUNTER — CLINICAL SUPPORT (OUTPATIENT)
Dept: OTHER | Facility: CLINIC | Age: 51
End: 2021-05-04
Payer: COMMERCIAL

## 2021-05-04 DIAGNOSIS — Z00.8 ENCOUNTER FOR OTHER GENERAL EXAMINATION: ICD-10-CM

## 2021-05-05 VITALS — BODY MASS INDEX: 26.83 KG/M2 | HEIGHT: 66 IN

## 2021-05-05 LAB
HDLC SERPL-MCNC: 65 MG/DL
POC CHOLESTEROL, LDL (DOCK): 103 MG/DL
POC CHOLESTEROL, TOTAL: 181 MG/DL
POC GLUCOSE, FASTING: 92 MG/DL (ref 60–110)
TRIGL SERPL-MCNC: 68 MG/DL

## 2021-12-16 ENCOUNTER — PATIENT MESSAGE (OUTPATIENT)
Dept: OBSTETRICS AND GYNECOLOGY | Facility: CLINIC | Age: 51
End: 2021-12-16
Payer: COMMERCIAL

## 2021-12-16 DIAGNOSIS — Z12.31 BREAST CANCER SCREENING BY MAMMOGRAM: Primary | ICD-10-CM

## 2021-12-17 ENCOUNTER — PATIENT MESSAGE (OUTPATIENT)
Dept: OBSTETRICS AND GYNECOLOGY | Facility: CLINIC | Age: 51
End: 2021-12-17
Payer: COMMERCIAL

## 2021-12-21 ENCOUNTER — IMMUNIZATION (OUTPATIENT)
Dept: OBSTETRICS AND GYNECOLOGY | Facility: CLINIC | Age: 51
End: 2021-12-21
Payer: COMMERCIAL

## 2021-12-21 DIAGNOSIS — Z23 NEED FOR VACCINATION: Primary | ICD-10-CM

## 2021-12-21 PROCEDURE — 0004A COVID-19, MRNA, LNP-S, PF, 30 MCG/0.3 ML DOSE VACCINE: CPT | Mod: PBBFAC | Performed by: FAMILY MEDICINE

## 2022-01-12 ENCOUNTER — HOSPITAL ENCOUNTER (OUTPATIENT)
Dept: RADIOLOGY | Facility: HOSPITAL | Age: 52
Discharge: HOME | End: 2022-01-12
Attending: OBSTETRICS & GYNECOLOGY
Payer: COMMERCIAL

## 2022-01-12 VITALS — HEIGHT: 66 IN | BODY MASS INDEX: 26.68 KG/M2 | WEIGHT: 166 LBS

## 2022-01-12 DIAGNOSIS — Z12.31 BREAST CANCER SCREENING BY MAMMOGRAM: ICD-10-CM

## 2022-01-12 PROCEDURE — 77067 SCR MAMMO BI INCL CAD: CPT | Mod: TC

## 2022-01-12 PROCEDURE — 77063 BREAST TOMOSYNTHESIS BI: CPT | Mod: TC

## 2022-01-12 PROCEDURE — 77067 SCR MAMMO BI INCL CAD: CPT | Mod: 26,,, | Performed by: RADIOLOGY

## 2022-01-12 PROCEDURE — 77063 BREAST TOMOSYNTHESIS BI: CPT | Mod: 26,,, | Performed by: RADIOLOGY

## 2022-01-12 PROCEDURE — 77063 MAMMO DIGITAL SCREENING BILAT WITH TOMO: ICD-10-PCS | Mod: 26,,, | Performed by: RADIOLOGY

## 2022-01-12 PROCEDURE — 77067 MAMMO DIGITAL SCREENING BILAT WITH TOMO: ICD-10-PCS | Mod: 26,,, | Performed by: RADIOLOGY

## 2022-03-28 ENCOUNTER — PATIENT MESSAGE (OUTPATIENT)
Dept: OBSTETRICS AND GYNECOLOGY | Facility: CLINIC | Age: 52
End: 2022-03-28
Payer: COMMERCIAL

## 2022-03-28 RX ORDER — LEVOTHYROXINE SODIUM 100 UG/1
TABLET ORAL
Qty: 90 TABLET | Refills: 1 | Status: CANCELLED | OUTPATIENT
Start: 2022-03-28

## 2022-03-28 RX ORDER — ESTRADIOL 1 MG/1
1 TABLET ORAL DAILY
Qty: 30 TABLET | Refills: 0 | Status: SHIPPED | OUTPATIENT
Start: 2022-03-28 | End: 2022-12-01 | Stop reason: SDUPTHER

## 2022-03-28 RX ORDER — MONTELUKAST SODIUM 10 MG/1
TABLET ORAL
Qty: 90 TABLET | Refills: 1 | Status: CANCELLED | OUTPATIENT
Start: 2022-03-28

## 2022-04-12 ENCOUNTER — CLINICAL SUPPORT (OUTPATIENT)
Dept: OTHER | Facility: CLINIC | Age: 52
End: 2022-04-12
Payer: COMMERCIAL

## 2022-04-12 DIAGNOSIS — Z00.8 ENCOUNTER FOR OTHER GENERAL EXAMINATION: ICD-10-CM

## 2022-04-13 LAB
HDLC SERPL-MCNC: 67 MG/DL
POC CHOLESTEROL, TOTAL: 194 MG/DL
POC GLUCOSE, FASTING: 100 MG/DL (ref 60–110)
TRIGL SERPL-MCNC: 44 MG/DL

## 2022-04-25 ENCOUNTER — LAB VISIT (OUTPATIENT)
Dept: LAB | Facility: HOSPITAL | Age: 52
End: 2022-04-25
Attending: INTERNAL MEDICINE
Payer: COMMERCIAL

## 2022-04-25 DIAGNOSIS — I10 BENIGN HYPERTENSION: ICD-10-CM

## 2022-04-25 DIAGNOSIS — I10 BENIGN HYPERTENSION: Primary | ICD-10-CM

## 2022-04-25 DIAGNOSIS — E03.4 ATROPHY OF THYROID: ICD-10-CM

## 2022-04-25 LAB
ALBUMIN SERPL BCP-MCNC: 3.8 G/DL (ref 3.5–5.2)
ALP SERPL-CCNC: 62 U/L (ref 55–135)
ALT SERPL W/O P-5'-P-CCNC: 22 U/L (ref 10–44)
ANION GAP SERPL CALC-SCNC: 7 MMOL/L (ref 8–16)
AST SERPL-CCNC: 16 U/L (ref 10–40)
BASOPHILS # BLD AUTO: 0.04 K/UL (ref 0–0.2)
BASOPHILS NFR BLD: 0.9 % (ref 0–1.9)
BILIRUB SERPL-MCNC: 0.5 MG/DL (ref 0.1–1)
BUN SERPL-MCNC: 17 MG/DL (ref 6–20)
CALCIUM SERPL-MCNC: 9 MG/DL (ref 8.7–10.5)
CHLORIDE SERPL-SCNC: 110 MMOL/L (ref 95–110)
CHOLEST SERPL-MCNC: 182 MG/DL (ref 120–199)
CHOLEST/HDLC SERPL: 3.1 {RATIO} (ref 2–5)
CO2 SERPL-SCNC: 26 MMOL/L (ref 23–29)
CREAT SERPL-MCNC: 0.8 MG/DL (ref 0.5–1.4)
DIFFERENTIAL METHOD: NORMAL
EOSINOPHIL # BLD AUTO: 0.1 K/UL (ref 0–0.5)
EOSINOPHIL NFR BLD: 2.9 % (ref 0–8)
ERYTHROCYTE [DISTWIDTH] IN BLOOD BY AUTOMATED COUNT: 12.6 % (ref 11.5–14.5)
EST. GFR  (AFRICAN AMERICAN): >60 ML/MIN/1.73 M^2
EST. GFR  (NON AFRICAN AMERICAN): >60 ML/MIN/1.73 M^2
GLUCOSE SERPL-MCNC: 97 MG/DL (ref 70–110)
HCT VFR BLD AUTO: 41 % (ref 37–48.5)
HDLC SERPL-MCNC: 58 MG/DL (ref 40–75)
HDLC SERPL: 31.9 % (ref 20–50)
HGB BLD-MCNC: 13.7 G/DL (ref 12–16)
IMM GRANULOCYTES # BLD AUTO: 0.02 K/UL (ref 0–0.04)
IMM GRANULOCYTES NFR BLD AUTO: 0.4 % (ref 0–0.5)
LDLC SERPL CALC-MCNC: 111.8 MG/DL (ref 63–159)
LYMPHOCYTES # BLD AUTO: 1.3 K/UL (ref 1–4.8)
LYMPHOCYTES NFR BLD: 28.2 % (ref 18–48)
MCH RBC QN AUTO: 30.8 PG (ref 27–31)
MCHC RBC AUTO-ENTMCNC: 33.4 G/DL (ref 32–36)
MCV RBC AUTO: 92 FL (ref 82–98)
MONOCYTES # BLD AUTO: 0.4 K/UL (ref 0.3–1)
MONOCYTES NFR BLD: 8 % (ref 4–15)
NEUTROPHILS # BLD AUTO: 2.7 K/UL (ref 1.8–7.7)
NEUTROPHILS NFR BLD: 59.6 % (ref 38–73)
NONHDLC SERPL-MCNC: 124 MG/DL
NRBC BLD-RTO: 0 /100 WBC
PLATELET # BLD AUTO: 226 K/UL (ref 150–450)
PMV BLD AUTO: 9.9 FL (ref 9.2–12.9)
POTASSIUM SERPL-SCNC: 4.8 MMOL/L (ref 3.5–5.1)
PROT SERPL-MCNC: 6.9 G/DL (ref 6–8.4)
RBC # BLD AUTO: 4.45 M/UL (ref 4–5.4)
SODIUM SERPL-SCNC: 143 MMOL/L (ref 136–145)
T4 FREE SERPL-MCNC: 1.39 NG/DL (ref 0.71–1.51)
TRIGL SERPL-MCNC: 61 MG/DL (ref 30–150)
TSH SERPL DL<=0.005 MIU/L-ACNC: 0.6 UIU/ML (ref 0.4–4)
WBC # BLD AUTO: 4.5 K/UL (ref 3.9–12.7)

## 2022-04-25 PROCEDURE — 84443 ASSAY THYROID STIM HORMONE: CPT | Performed by: INTERNAL MEDICINE

## 2022-04-25 PROCEDURE — 80053 COMPREHEN METABOLIC PANEL: CPT | Performed by: INTERNAL MEDICINE

## 2022-04-25 PROCEDURE — 85025 COMPLETE CBC W/AUTO DIFF WBC: CPT | Performed by: INTERNAL MEDICINE

## 2022-04-25 PROCEDURE — 80061 LIPID PANEL: CPT | Performed by: INTERNAL MEDICINE

## 2022-04-25 PROCEDURE — 84439 ASSAY OF FREE THYROXINE: CPT | Performed by: INTERNAL MEDICINE

## 2022-04-25 PROCEDURE — 36415 COLL VENOUS BLD VENIPUNCTURE: CPT | Performed by: INTERNAL MEDICINE

## 2022-05-02 ENCOUNTER — OFFICE VISIT (OUTPATIENT)
Dept: OBSTETRICS AND GYNECOLOGY | Facility: CLINIC | Age: 52
End: 2022-05-02
Payer: COMMERCIAL

## 2022-05-02 VITALS
WEIGHT: 172.81 LBS | SYSTOLIC BLOOD PRESSURE: 148 MMHG | DIASTOLIC BLOOD PRESSURE: 100 MMHG | BODY MASS INDEX: 27.9 KG/M2

## 2022-05-02 DIAGNOSIS — Z30.431 IUD CHECK UP: ICD-10-CM

## 2022-05-02 DIAGNOSIS — Z01.419 WELL WOMAN EXAM WITH ROUTINE GYNECOLOGICAL EXAM: Primary | ICD-10-CM

## 2022-05-02 DIAGNOSIS — Z12.4 ENCOUNTER FOR PAPANICOLAOU SMEAR FOR CERVICAL CANCER SCREENING: ICD-10-CM

## 2022-05-02 PROCEDURE — 3008F PR BODY MASS INDEX (BMI) DOCUMENTED: ICD-10-PCS | Mod: CPTII,S$GLB,, | Performed by: OBSTETRICS & GYNECOLOGY

## 2022-05-02 PROCEDURE — 99396 PR PREVENTIVE VISIT,EST,40-64: ICD-10-PCS | Mod: S$GLB,,, | Performed by: OBSTETRICS & GYNECOLOGY

## 2022-05-02 PROCEDURE — 3080F DIAST BP >= 90 MM HG: CPT | Mod: CPTII,S$GLB,, | Performed by: OBSTETRICS & GYNECOLOGY

## 2022-05-02 PROCEDURE — 1159F MED LIST DOCD IN RCRD: CPT | Mod: CPTII,S$GLB,, | Performed by: OBSTETRICS & GYNECOLOGY

## 2022-05-02 PROCEDURE — 3008F BODY MASS INDEX DOCD: CPT | Mod: CPTII,S$GLB,, | Performed by: OBSTETRICS & GYNECOLOGY

## 2022-05-02 PROCEDURE — 3077F SYST BP >= 140 MM HG: CPT | Mod: CPTII,S$GLB,, | Performed by: OBSTETRICS & GYNECOLOGY

## 2022-05-02 PROCEDURE — 99999 PR PBB SHADOW E&M-EST. PATIENT-LVL III: CPT | Mod: PBBFAC,,, | Performed by: OBSTETRICS & GYNECOLOGY

## 2022-05-02 PROCEDURE — 1159F PR MEDICATION LIST DOCUMENTED IN MEDICAL RECORD: ICD-10-PCS | Mod: CPTII,S$GLB,, | Performed by: OBSTETRICS & GYNECOLOGY

## 2022-05-02 PROCEDURE — 99396 PREV VISIT EST AGE 40-64: CPT | Mod: S$GLB,,, | Performed by: OBSTETRICS & GYNECOLOGY

## 2022-05-02 PROCEDURE — 87624 HPV HI-RISK TYP POOLED RSLT: CPT | Performed by: OBSTETRICS & GYNECOLOGY

## 2022-05-02 PROCEDURE — 3077F PR MOST RECENT SYSTOLIC BLOOD PRESSURE >= 140 MM HG: ICD-10-PCS | Mod: CPTII,S$GLB,, | Performed by: OBSTETRICS & GYNECOLOGY

## 2022-05-02 PROCEDURE — 99999 PR PBB SHADOW E&M-EST. PATIENT-LVL III: ICD-10-PCS | Mod: PBBFAC,,, | Performed by: OBSTETRICS & GYNECOLOGY

## 2022-05-02 PROCEDURE — 88175 CYTOPATH C/V AUTO FLUID REDO: CPT | Performed by: OBSTETRICS & GYNECOLOGY

## 2022-05-02 PROCEDURE — 3080F PR MOST RECENT DIASTOLIC BLOOD PRESSURE >= 90 MM HG: ICD-10-PCS | Mod: CPTII,S$GLB,, | Performed by: OBSTETRICS & GYNECOLOGY

## 2022-05-02 NOTE — PROGRESS NOTES
SUBJECTIVE:   Faith Johnson is a 52 y.o. female   for annual well woman exam. No LMP recorded (approximate). Patient has had an implant..  She has no unusual complaints.      Contraception: mirena iud since 2019 - first iud  Has been on estradiol once daily since  for BTB  She recently stopped estradiol 1 month ago and has not had any bleeding  Denies vasomotor symptoms  US in 2019 shows 2.4 cm and 1.5 cm fibroid    Past Medical History:   Diagnosis Date    Hypertension     Hypothyroidism     Mitral valve prolapse      Past Surgical History:   Procedure Laterality Date     SECTION      CHOLECYSTECTOMY      plantar fasciitis       Social History     Socioeconomic History    Marital status:    Tobacco Use    Smoking status: Never Smoker    Smokeless tobacco: Never Used   Substance and Sexual Activity    Alcohol use: Yes     Comment: rare    Drug use: No    Sexual activity: Not Currently     Partners: Male     Birth control/protection: I.U.D.     Family History   Problem Relation Age of Onset    Breast cancer Neg Hx     Colon cancer Neg Hx     Ovarian cancer Neg Hx      OB History    Para Term  AB Living   1 1 1         SAB IAB Ectopic Multiple Live Births                  # Outcome Date GA Lbr Lefty/2nd Weight Sex Delivery Anes PTL Lv   1 Term               Obstetric Comments   Gynhx: reg/heavy   mirena iud placed 2019   Denies abnl pap, Pap 2019 neg   MMG 10/2019         Current Outpatient Medications   Medication Sig Dispense Refill    levocetirizine (XYZAL) 5 MG tablet Take 1 tablet (5 mg total) by mouth every evening. 30 tablet 5    levothyroxine (SYNTHROID) 100 MCG tablet Take 1 tablet by mouth once daily 90 tablet 3    meloxicam (MOBIC) 7.5 MG tablet Take 1 tablet by mouth daily 30 tablet 11    montelukast (SINGULAIR) 10 mg tablet TAKE 1 TABLET BY MOUTH EVERY EVENING 90 tablet 1    drospirenone-ethinyl estradiol (OCELLA) 3-0.03 mg per tablet 1  tablet po x 3 week, skip placebo row and start new pack (Patient not taking: Reported on 5/2/2022) 84 tablet 4    estradioL (ESTRACE) 1 MG tablet Take 1 tablet (1 mg total) by mouth once daily. (Patient not taking: Reported on 5/2/2022) 30 tablet 0    levothyroxine (SYNTHROID) 100 MCG tablet Take 100 mcg by mouth once daily.  0    levothyroxine (SYNTHROID) 100 MCG tablet TAKE 1 TABLET BY MOUTH ONCE DAILY (Patient not taking: Reported on 5/2/2022) 90 tablet 1    montelukast (SINGULAIR) 10 mg tablet Take 10 mg by mouth every evening.       Current Facility-Administered Medications   Medication Dose Route Frequency Provider Last Rate Last Admin    levonorgestrel 20 mcg/24 hours (5 yrs) 52 mg IUD 1 Intra Uterine Device  1 Intra Uterine Device Intrauterine  Ashanti Bee MD   1 Intra Uterine Device at 05/23/19 1527     Allergies: Codeine       ROS:  GENERAL: Denies weight gain or weight loss. Feeling well overall.   SKIN: Denies rash or lesions.   HEAD: Denies head injury or headache.   NODES: Denies enlarged lymph nodes.   CHEST: Denies chest pain or shortness of breath.   CARDIOVASCULAR: Denies palpitations or left sided chest pain.   ABDOMEN: No abdominal pain, constipation, diarrhea, nausea, vomiting or rectal bleeding.   URINARY: No frequency, dysuria, hematuria, or burning on urination.  REPRODUCTIVE: Denies vaginal discharge, abnormal vaginal bleeding, lesions, pelvic pain  BREASTS: The patient performs breast self-examination and denies pain, lumps, or nipple discharge.   HEMATOLOGIC: No easy bruisability or excessive bleeding.  MUSCULOSKELETAL: Denies joint pain or swelling.   NEUROLOGIC: Denies syncope or weakness.   PSYCHIATRIC: Denies depression, anxiety or mood swings.      OBJECTIVE:   BP (!) 148/100   Wt 78.4 kg (172 lb 13.5 oz)   LMP  (Approximate)   BMI 27.90 kg/m²   The patient appears well, alert, oriented x 3, in no distress.  PSYCH:  Normal, full range of affect  NECK: negative, no  thyromegaly, trachea midline  SKIN: normal, good color, good turgor and no acne, striae, hirsutism  BREAST EXAM: breasts appear normal, no suspicious masses, no skin or nipple changes or axillary nodes  ABDOMEN: soft, non-tender; bowel sounds normal; no masses,  no organomegaly and no hernias, masses, or hepatosplenomegaly  GENITALIA: normal external genitalia, no erythema, no discharge  BLADDER: soft  URETHRA: normal appearing urethra with no masses, tenderness or lesions and normal urethra, normal urethral meatus  VAGINA: Normal  CERVIX: no lesions or cervical motion tenderness, iud strings seen  UTERUS: normal size, contour, position, consistency, mobility, non-tender  ADNEXA: no mass, fullness, tenderness      ASSESSMENT:   1. Health maintenance  -pap done. Discussed ASCCP guideline screening every 3 - 5 years.   -screening MMG UTD  -counseled on exercise and healthy diet, weight loss  -bone health:  Discussed Vitamin D and Calcium supplementation, weight bearing exercises  2.  Discussed safety at home/school/work, healthy and balanced diet, sleep hygiene, as well as violence/weapons exposure.  3.  Contraception:  Self Check IUD strings monthly if possible, if BTB happens will plan to get US- check on fibroids as well

## 2022-05-05 VITALS
HEIGHT: 66 IN | BODY MASS INDEX: 27 KG/M2 | SYSTOLIC BLOOD PRESSURE: 140 MMHG | DIASTOLIC BLOOD PRESSURE: 90 MMHG | WEIGHT: 168 LBS

## 2022-05-09 ENCOUNTER — LAB VISIT (OUTPATIENT)
Dept: LAB | Facility: HOSPITAL | Age: 52
End: 2022-05-09
Attending: ORTHOPAEDIC SURGERY
Payer: COMMERCIAL

## 2022-05-09 DIAGNOSIS — M19.90 ARTHRITIS: Primary | ICD-10-CM

## 2022-05-09 LAB
BASOPHILS # BLD AUTO: 0.04 K/UL (ref 0–0.2)
BASOPHILS NFR BLD: 0.7 % (ref 0–1.9)
CCP AB SER IA-ACNC: <0.5 U/ML
CRP SERPL-MCNC: 1.2 MG/L (ref 0–8.2)
DIFFERENTIAL METHOD: NORMAL
EOSINOPHIL # BLD AUTO: 0.1 K/UL (ref 0–0.5)
EOSINOPHIL NFR BLD: 1.6 % (ref 0–8)
ERYTHROCYTE [DISTWIDTH] IN BLOOD BY AUTOMATED COUNT: 12.8 % (ref 11.5–14.5)
ERYTHROCYTE [SEDIMENTATION RATE] IN BLOOD BY WESTERGREN METHOD: 4 MM/HR (ref 0–20)
FINAL PATHOLOGIC DIAGNOSIS: NORMAL
HCT VFR BLD AUTO: 41.5 % (ref 37–48.5)
HGB BLD-MCNC: 13.7 G/DL (ref 12–16)
HPV HR 12 DNA SPEC QL NAA+PROBE: NEGATIVE
HPV16 AG SPEC QL: NEGATIVE
HPV18 DNA SPEC QL NAA+PROBE: NEGATIVE
IMM GRANULOCYTES # BLD AUTO: 0.02 K/UL (ref 0–0.04)
IMM GRANULOCYTES NFR BLD AUTO: 0.3 % (ref 0–0.5)
LYMPHOCYTES # BLD AUTO: 1.4 K/UL (ref 1–4.8)
LYMPHOCYTES NFR BLD: 24.6 % (ref 18–48)
Lab: NORMAL
MCH RBC QN AUTO: 30.4 PG (ref 27–31)
MCHC RBC AUTO-ENTMCNC: 33 G/DL (ref 32–36)
MCV RBC AUTO: 92 FL (ref 82–98)
MONOCYTES # BLD AUTO: 0.4 K/UL (ref 0.3–1)
MONOCYTES NFR BLD: 6.8 % (ref 4–15)
NEUTROPHILS # BLD AUTO: 3.8 K/UL (ref 1.8–7.7)
NEUTROPHILS NFR BLD: 66 % (ref 38–73)
NRBC BLD-RTO: 0 /100 WBC
PLATELET # BLD AUTO: 248 K/UL (ref 150–450)
PMV BLD AUTO: 9.9 FL (ref 9.2–12.9)
RBC # BLD AUTO: 4.5 M/UL (ref 4–5.4)
RHEUMATOID FACT SERPL-ACNC: <13 IU/ML (ref 0–15)
WBC # BLD AUTO: 5.73 K/UL (ref 3.9–12.7)

## 2022-05-09 PROCEDURE — 86225 DNA ANTIBODY NATIVE: CPT | Mod: 59 | Performed by: ORTHOPAEDIC SURGERY

## 2022-05-09 PROCEDURE — 36415 COLL VENOUS BLD VENIPUNCTURE: CPT | Performed by: ORTHOPAEDIC SURGERY

## 2022-05-09 PROCEDURE — 86235 NUCLEAR ANTIGEN ANTIBODY: CPT | Mod: 59 | Performed by: ORTHOPAEDIC SURGERY

## 2022-05-09 PROCEDURE — 86140 C-REACTIVE PROTEIN: CPT | Performed by: ORTHOPAEDIC SURGERY

## 2022-05-09 PROCEDURE — 86038 ANTINUCLEAR ANTIBODIES: CPT | Performed by: ORTHOPAEDIC SURGERY

## 2022-05-09 PROCEDURE — 86039 ANTINUCLEAR ANTIBODIES (ANA): CPT | Performed by: ORTHOPAEDIC SURGERY

## 2022-05-09 PROCEDURE — 86431 RHEUMATOID FACTOR QUANT: CPT | Performed by: ORTHOPAEDIC SURGERY

## 2022-05-09 PROCEDURE — 86200 CCP ANTIBODY: CPT | Performed by: ORTHOPAEDIC SURGERY

## 2022-05-09 PROCEDURE — 85025 COMPLETE CBC W/AUTO DIFF WBC: CPT | Performed by: ORTHOPAEDIC SURGERY

## 2022-05-09 PROCEDURE — 85652 RBC SED RATE AUTOMATED: CPT | Performed by: ORTHOPAEDIC SURGERY

## 2022-05-10 LAB
ANA PATTERN 1: NORMAL
ANA SER QL IF: POSITIVE
ANA TITR SER IF: NORMAL {TITER}

## 2022-05-10 NOTE — PROGRESS NOTES
Hi Faith    Your pap smear is normal.      Let me know if you have any questions.      Dr. Bhavya Oviedo

## 2022-05-12 LAB
ANTI SM ANTIBODY: 0.08 RATIO (ref 0–0.99)
ANTI SM/RNP ANTIBODY: 0.09 RATIO (ref 0–0.99)
ANTI-SM INTERPRETATION: NEGATIVE
ANTI-SM/RNP INTERPRETATION: NEGATIVE
ANTI-SSA ANTIBODY: 0.08 RATIO (ref 0–0.99)
ANTI-SSA INTERPRETATION: NEGATIVE
ANTI-SSB ANTIBODY: 0.06 RATIO (ref 0–0.99)
ANTI-SSB INTERPRETATION: NEGATIVE
DNA TITER: NORMAL
DSDNA AB SER-ACNC: POSITIVE [IU]/ML

## 2022-07-07 RX ORDER — MONTELUKAST SODIUM 10 MG/1
TABLET ORAL
Qty: 90 TABLET | Refills: 1 | Status: CANCELLED | OUTPATIENT
Start: 2022-07-07

## 2022-08-23 ENCOUNTER — OFFICE VISIT (OUTPATIENT)
Dept: OPTOMETRY | Facility: CLINIC | Age: 52
End: 2022-08-23
Payer: COMMERCIAL

## 2022-08-23 DIAGNOSIS — H52.4 MYOPIA WITH PRESBYOPIA, BILATERAL: ICD-10-CM

## 2022-08-23 DIAGNOSIS — H52.13 MYOPIA WITH PRESBYOPIA, BILATERAL: ICD-10-CM

## 2022-08-23 DIAGNOSIS — Z01.00 EXAMINATION OF EYES AND VISION: Primary | ICD-10-CM

## 2022-08-23 PROBLEM — H52.7 REFRACTIVE ERROR: Status: ACTIVE | Noted: 2022-08-23

## 2022-08-23 PROCEDURE — 92004 PR EYE EXAM, NEW PATIENT,COMPREHESV: ICD-10-PCS | Mod: S$GLB,,, | Performed by: OPTOMETRIST

## 2022-08-23 PROCEDURE — 92015 DETERMINE REFRACTIVE STATE: CPT | Mod: S$GLB,,, | Performed by: OPTOMETRIST

## 2022-08-23 PROCEDURE — 99999 PR PBB SHADOW E&M-EST. PATIENT-LVL III: ICD-10-PCS | Mod: PBBFAC,,, | Performed by: OPTOMETRIST

## 2022-08-23 PROCEDURE — 99999 PR PBB SHADOW E&M-EST. PATIENT-LVL III: CPT | Mod: PBBFAC,,, | Performed by: OPTOMETRIST

## 2022-08-23 PROCEDURE — 92004 COMPRE OPH EXAM NEW PT 1/>: CPT | Mod: S$GLB,,, | Performed by: OPTOMETRIST

## 2022-08-23 PROCEDURE — 92015 PR REFRACTION: ICD-10-PCS | Mod: S$GLB,,, | Performed by: OPTOMETRIST

## 2022-08-23 NOTE — PROGRESS NOTES
Subjective:       Patient ID: Faith Johnson is a 52 y.o. female      Chief Complaint   Patient presents with    Concerns About Ocular Health     History of Present Illness  Dls: 1 yr     53 y/o female presents today with c/o blurry vision at distance ou.   Pt wears single vision glasses for distance.     No tearing  No itching   No burning  No pain  No ha's  No floaters  No flashes    Eye meds  None       Assessment/Plan:     1. Examination of eyes and vision  Eyemed vision    2. Myopia with presbyopia, bilateral  Educated patient on refractive error and discussed lens options. Dispensed updated spectacle Rx. Educated about adaptation period to new specs.    Eyeglass Final Rx     Eyeglass Final Rx       Sphere Cylinder Add    Right -0.50 Sphere +1.75    Left -0.50 Sphere +1.75    Expiration Date: 8/23/2023                  Follow up in about 1 year (around 8/23/2023).

## 2022-10-05 ENCOUNTER — HOSPITAL ENCOUNTER (OUTPATIENT)
Dept: RADIOLOGY | Facility: HOSPITAL | Age: 52
Discharge: HOME | End: 2022-10-05
Attending: ORTHOPAEDIC SURGERY
Payer: COMMERCIAL

## 2022-10-05 DIAGNOSIS — M51.36 DDD (DEGENERATIVE DISC DISEASE), LUMBAR: ICD-10-CM

## 2022-10-05 PROCEDURE — 72148 MRI LUMBAR SPINE W/O DYE: CPT | Mod: TC

## 2022-10-05 PROCEDURE — 72148 MRI LUMBAR SPINE WITHOUT CONTRAST: ICD-10-PCS | Mod: 26,,, | Performed by: RADIOLOGY

## 2022-10-05 PROCEDURE — 72148 MRI LUMBAR SPINE W/O DYE: CPT | Mod: 26,,, | Performed by: RADIOLOGY

## 2022-10-11 ENCOUNTER — TELEPHONE (OUTPATIENT)
Dept: PAIN MEDICINE | Facility: CLINIC | Age: 52
End: 2022-10-11
Payer: COMMERCIAL

## 2022-10-11 DIAGNOSIS — M51.36 DDD (DEGENERATIVE DISC DISEASE), LUMBAR: Primary | ICD-10-CM

## 2022-10-11 DIAGNOSIS — M54.16 LUMBAR RADICULOPATHY: ICD-10-CM

## 2022-10-11 NOTE — TELEPHONE ENCOUNTER
----- Message from Juan Conrad Jr., MD sent at 10/11/2022 10:35 AM CDT -----  Regarding: RE: Procedure  B/l L5 TFESI. Also offer an appointment if she would like one. Thanks.   ----- Message -----  From: Leanne Mathias RN  Sent: 10/11/2022  10:24 AM CDT  To: Juan Conrad Jr., MD  Subject: Procedure                                        There is a referral in [facility] for a lumbar SHEBLIE.  Please review imaging and confirm what she is to be scheduled for. Thanks

## 2022-10-11 NOTE — TELEPHONE ENCOUNTER
Ms. Cuevas would like to schedule the bilat L5 TF SHELBIE on 10/26/2022 ref: by Dr. Barrientos- sruthi [place].

## 2022-10-18 ENCOUNTER — TELEPHONE (OUTPATIENT)
Dept: PAIN MEDICINE | Facility: CLINIC | Age: 52
End: 2022-10-18
Payer: COMMERCIAL

## 2022-10-18 NOTE — TELEPHONE ENCOUNTER
Reviewed pre-procedure instructions with  Faith, as well as provided arrival time of 12:45p for 10/26/2022.  All questions answered- verbalized understanding.

## 2022-10-24 NOTE — DISCHARGE INSTRUCTIONS

## 2022-10-26 ENCOUNTER — HOSPITAL ENCOUNTER (OUTPATIENT)
Facility: HOSPITAL | Age: 52
Discharge: HOME | End: 2022-10-26
Attending: PAIN MEDICINE | Admitting: PAIN MEDICINE
Payer: COMMERCIAL

## 2022-10-26 VITALS
DIASTOLIC BLOOD PRESSURE: 87 MMHG | HEART RATE: 64 BPM | SYSTOLIC BLOOD PRESSURE: 178 MMHG | TEMPERATURE: 99 F | RESPIRATION RATE: 18 BRPM | OXYGEN SATURATION: 95 %

## 2022-10-26 DIAGNOSIS — M54.16 LUMBAR RADICULOPATHY: Primary | ICD-10-CM

## 2022-10-26 PROCEDURE — 25500020 PHARM REV CODE 255: Performed by: PAIN MEDICINE

## 2022-10-26 PROCEDURE — 25000003 PHARM REV CODE 250: Performed by: PAIN MEDICINE

## 2022-10-26 PROCEDURE — 62323 PR INJ LUMBAR/SACRAL, W/IMAGING GUIDANCE: ICD-10-PCS | Mod: ,,, | Performed by: PAIN MEDICINE

## 2022-10-26 PROCEDURE — 64483 NJX AA&/STRD TFRM EPI L/S 1: CPT | Mod: 50 | Performed by: PAIN MEDICINE

## 2022-10-26 PROCEDURE — 62323 NJX INTERLAMINAR LMBR/SAC: CPT | Mod: ,,, | Performed by: PAIN MEDICINE

## 2022-10-26 PROCEDURE — 63600175 PHARM REV CODE 636 W HCPCS: Performed by: PAIN MEDICINE

## 2022-10-26 RX ORDER — LIDOCAINE HYDROCHLORIDE 10 MG/ML
INJECTION, SOLUTION EPIDURAL; INFILTRATION; INTRACAUDAL; PERINEURAL
Status: DISCONTINUED
Start: 2022-10-26 | End: 2022-10-26 | Stop reason: HOSPADM

## 2022-10-26 RX ORDER — DEXTROMETHORPHAN HYDROBROMIDE, GUAIFENESIN 5; 100 MG/5ML; MG/5ML
1300 LIQUID ORAL EVERY 8 HOURS
COMMUNITY
End: 2023-10-16

## 2022-10-26 RX ORDER — LIDOCAINE HYDROCHLORIDE 10 MG/ML
10 INJECTION INFILTRATION; PERINEURAL ONCE
Status: COMPLETED | OUTPATIENT
Start: 2022-10-26 | End: 2022-10-26

## 2022-10-26 RX ORDER — LIDOCAINE HYDROCHLORIDE 10 MG/ML
INJECTION INFILTRATION; PERINEURAL
Status: DISCONTINUED
Start: 2022-10-26 | End: 2022-10-26 | Stop reason: HOSPADM

## 2022-10-26 RX ORDER — DEXAMETHASONE SODIUM PHOSPHATE 10 MG/ML
INJECTION INTRAMUSCULAR; INTRAVENOUS
Status: DISCONTINUED
Start: 2022-10-26 | End: 2022-10-26 | Stop reason: HOSPADM

## 2022-10-26 RX ORDER — LIDOCAINE HYDROCHLORIDE 20 MG/ML
10 INJECTION, SOLUTION INFILTRATION; PERINEURAL ONCE
Status: DISCONTINUED | OUTPATIENT
Start: 2022-10-26 | End: 2022-10-26 | Stop reason: RX

## 2022-10-26 RX ORDER — ALPRAZOLAM 0.5 MG/1
1 TABLET, ORALLY DISINTEGRATING ORAL ONCE AS NEEDED
Status: DISCONTINUED | OUTPATIENT
Start: 2022-10-26 | End: 2022-10-26 | Stop reason: HOSPADM

## 2022-10-26 RX ORDER — DEXAMETHASONE SODIUM PHOSPHATE 10 MG/ML
10 INJECTION INTRAMUSCULAR; INTRAVENOUS ONCE
Status: COMPLETED | OUTPATIENT
Start: 2022-10-26 | End: 2022-10-26

## 2022-10-26 RX ORDER — LIDOCAINE HYDROCHLORIDE 10 MG/ML
1 INJECTION, SOLUTION EPIDURAL; INFILTRATION; INTRACAUDAL; PERINEURAL ONCE
Status: COMPLETED | OUTPATIENT
Start: 2022-10-26 | End: 2022-10-26

## 2022-10-26 NOTE — OP NOTE
Lumbar transforaminal SHELBIE    Time-out taken to identify patient and procedure side prior to starting the procedure.   I attest that I have reviewed the patient's home medications prior to the procedure and no contraindication have been identified. I  re-evaluated the patient after the patient was positioned for the procedure in the procedure room immediately before the procedural time-out. The vital signs are current and represent the current state of the patient which has not significantly changed since the preprocedure assessment.                              Date of Service: 10/26/2022    PCP: Janelle Larios MD    Referring Physician:                                     PROCEDURE: Bilateral L5 transforaminal epidural steroid injection under fluoroscopy    REASON FOR PROCEDURE: Bilateral DDD (degenerative disc disease), lumbar [M51.36]  Lumbar radiculopathy [M54.16]    PHYSICIAN: Juan Conrad MD  ASSISTANTS:None    MEDICATIONS INJECTED:  Preservative-free dexamethasone 10mg, Xylocaine 1% MPF 3-5ml. 3ml per level. Preservative free, sterile normal saline is used to get larger volume as needed.  LOCAL ANESTHETIC INJECTED:  Xylocaine 1% 3ml per site.    SEDATION MEDICATIONS: None  ESTIMATED BLOOD LOSS:  None.    COMPLICATIONS:  None.    TECHNIQUE:   Laying in a prone position, the patient was prepped and draped in the usual sterile fashion using ChloraPrep and fenestrated drape.  The area to be injected was determined under fluoroscopic guidance.  Local anesthetic was given by raising a wheel and going down to the hub of a 27-gauge 1.25 inch needle.  The 4.75 inch 25-gauge spinal needle was introduced towards the transverse process of each above named nerve root level.  The needle was walked medially then hinged into the neural foramen.  Omnipaque was injected to confirm appropriate placement and that there was no vascular runoff.  The medication was then injected after applying negative pressure. The  patient tolerated the procedure well.    PAIN BEFORE THE PROCEDURE: 6/10.    PAIN AFTER THE PROCEDURE: 4/10.    The patient was monitored after the procedure.  Patient was given post procedure and discharge instructions to follow at home.  We will see the patient back in two weeks or the patient may call to inform of status. The patient was discharged in a stable condition.

## 2022-10-26 NOTE — H&P
Subjective:     Patient ID: Faith Johnson is a 52 y.o. female    Chief Complaint: Low back pain    Referred by: Juan Conrad Jr*      HPI:    Faith Johnson is a 52 y.o. female who presents today for b/l L5 TFESI. She denies any changes in the quality or location of the pain.        Review of Systems   Constitutional:  Negative for activity change, appetite change, chills, fatigue, fever and unexpected weight change.   HENT:  Negative for hearing loss.    Eyes:  Negative for visual disturbance.   Respiratory:  Negative for chest tightness and shortness of breath.    Cardiovascular:  Negative for chest pain.   Gastrointestinal:  Negative for abdominal pain, constipation, diarrhea, nausea and vomiting.   Genitourinary:  Negative for difficulty urinating.   Musculoskeletal:  Positive for arthralgias, back pain, gait problem and myalgias. Negative for neck pain.   Skin:  Negative for rash.   Neurological:  Negative for dizziness, weakness, light-headedness, numbness and headaches.   Psychiatric/Behavioral:  Positive for sleep disturbance. Negative for hallucinations and suicidal ideas. The patient is not nervous/anxious.      Past Medical History:   Diagnosis Date    Hypertension     Hypothyroidism     Mitral valve prolapse        Past Surgical History:   Procedure Laterality Date     SECTION      CHOLECYSTECTOMY      plantar fasciitis         Social History     Socioeconomic History    Marital status:    Tobacco Use    Smoking status: Never    Smokeless tobacco: Never   Substance and Sexual Activity    Alcohol use: Yes     Comment: rare    Drug use: No    Sexual activity: Not Currently     Partners: Male     Birth control/protection: I.U.D.       Review of patient's allergies indicates:   Allergen Reactions    Codeine        No current facility-administered medications on file prior to encounter.     Current Outpatient Medications on File Prior to Encounter   Medication Sig Dispense Refill     acetaminophen (TYLENOL) 650 MG TbSR Take 1,300 mg by mouth every 8 (eight) hours.      levothyroxine (SYNTHROID) 100 MCG tablet Take 100 mcg by mouth once daily.  0    meloxicam (MOBIC) 7.5 MG tablet Take 1 tablet by mouth daily 30 tablet 11    montelukast (SINGULAIR) 10 mg tablet TAKE 1 TABLET BY MOUTH EVERY EVENING 90 tablet 1    drospirenone-ethinyl estradiol (OCELLA) 3-0.03 mg per tablet 1 tablet po x 3 week, skip placebo row and start new pack 84 tablet 4    estradioL (ESTRACE) 1 MG tablet Take 1 tablet (1 mg total) by mouth once daily. 30 tablet 0    levocetirizine (XYZAL) 5 MG tablet Take 1 tablet (5 mg total) by mouth every evening. 30 tablet 5    levothyroxine (SYNTHROID) 100 MCG tablet TAKE 1 TABLET BY MOUTH ONCE DAILY 90 tablet 1    levothyroxine (SYNTHROID) 100 MCG tablet Take 1 tablet by mouth once daily 90 tablet 3    montelukast (SINGULAIR) 10 mg tablet Take 10 mg by mouth every evening.         Objective:      BP (!) 158/91 (BP Location: Left arm, Patient Position: Sitting)   Pulse 75   Temp 98.5 °F (36.9 °C) (Oral)   Resp 20   SpO2 97%   Breastfeeding No     Exam:  AAOx3 NAD  Mood and affect normal  Memory and language intact  Breathing non labored      Assessment:       Lumbar radiculopathy      Plan:         Faith Johnson is a 52 y.o. female with lumbar radiculopathy.    Proceed with SHELBIE as planned.

## 2022-10-26 NOTE — DISCHARGE SUMMARY
[facility]  Discharge Note  Short Stay    Procedure(s) (LRB):  Bilateral L5 Transforaminal Epidural Steroid Injections (ref: [place]) (Bilateral)      OUTCOME: Patient tolerated treatment/procedure well without complication and is now ready for discharge.    DISPOSITION: Home or Self Care    FINAL DIAGNOSIS:  <principal problem not specified>    FOLLOWUP: In clinic    DISCHARGE INSTRUCTIONS:  No discharge procedures on file.       Discharge Diagnosis:DDD (degenerative disc disease), lumbar [M51.36]  Lumbar radiculopathy [M54.16]  Condition on Discharge: Stable.  Diet on Discharge: Same as before.  Activity: as per instruction sheet.  Discharge to: Home with a responsible adult.  Follow up: as per Discharge instructions

## 2022-11-01 ENCOUNTER — PATIENT MESSAGE (OUTPATIENT)
Dept: PAIN MEDICINE | Facility: CLINIC | Age: 52
End: 2022-11-01
Payer: COMMERCIAL

## 2022-11-07 ENCOUNTER — OFFICE VISIT (OUTPATIENT)
Dept: PAIN MEDICINE | Facility: CLINIC | Age: 52
End: 2022-11-07
Payer: COMMERCIAL

## 2022-11-07 VITALS
HEART RATE: 73 BPM | WEIGHT: 177.88 LBS | HEIGHT: 66 IN | BODY MASS INDEX: 28.59 KG/M2 | OXYGEN SATURATION: 98 % | DIASTOLIC BLOOD PRESSURE: 82 MMHG | SYSTOLIC BLOOD PRESSURE: 169 MMHG

## 2022-11-07 DIAGNOSIS — M50.30 DDD (DEGENERATIVE DISC DISEASE), CERVICAL: ICD-10-CM

## 2022-11-07 DIAGNOSIS — M47.812 CERVICAL SPONDYLOSIS: ICD-10-CM

## 2022-11-07 DIAGNOSIS — M47.816 LUMBAR SPONDYLOSIS: ICD-10-CM

## 2022-11-07 DIAGNOSIS — M51.36 DDD (DEGENERATIVE DISC DISEASE), LUMBAR: Primary | ICD-10-CM

## 2022-11-07 PROBLEM — M51.369 DDD (DEGENERATIVE DISC DISEASE), LUMBAR: Status: ACTIVE | Noted: 2022-11-07

## 2022-11-07 PROCEDURE — 1160F PR REVIEW ALL MEDS BY PRESCRIBER/CLIN PHARMACIST DOCUMENTED: ICD-10-PCS | Mod: CPTII,S$GLB,, | Performed by: PAIN MEDICINE

## 2022-11-07 PROCEDURE — 3077F PR MOST RECENT SYSTOLIC BLOOD PRESSURE >= 140 MM HG: ICD-10-PCS | Mod: CPTII,S$GLB,, | Performed by: PAIN MEDICINE

## 2022-11-07 PROCEDURE — 99999 PR PBB SHADOW E&M-EST. PATIENT-LVL IV: ICD-10-PCS | Mod: PBBFAC,,, | Performed by: PAIN MEDICINE

## 2022-11-07 PROCEDURE — 1159F PR MEDICATION LIST DOCUMENTED IN MEDICAL RECORD: ICD-10-PCS | Mod: CPTII,S$GLB,, | Performed by: PAIN MEDICINE

## 2022-11-07 PROCEDURE — 3079F DIAST BP 80-89 MM HG: CPT | Mod: CPTII,S$GLB,, | Performed by: PAIN MEDICINE

## 2022-11-07 PROCEDURE — 3079F PR MOST RECENT DIASTOLIC BLOOD PRESSURE 80-89 MM HG: ICD-10-PCS | Mod: CPTII,S$GLB,, | Performed by: PAIN MEDICINE

## 2022-11-07 PROCEDURE — 99999 PR PBB SHADOW E&M-EST. PATIENT-LVL IV: CPT | Mod: PBBFAC,,, | Performed by: PAIN MEDICINE

## 2022-11-07 PROCEDURE — 3077F SYST BP >= 140 MM HG: CPT | Mod: CPTII,S$GLB,, | Performed by: PAIN MEDICINE

## 2022-11-07 PROCEDURE — 99204 PR OFFICE/OUTPT VISIT, NEW, LEVL IV, 45-59 MIN: ICD-10-PCS | Mod: S$GLB,,, | Performed by: PAIN MEDICINE

## 2022-11-07 PROCEDURE — 1159F MED LIST DOCD IN RCRD: CPT | Mod: CPTII,S$GLB,, | Performed by: PAIN MEDICINE

## 2022-11-07 PROCEDURE — 1160F RVW MEDS BY RX/DR IN RCRD: CPT | Mod: CPTII,S$GLB,, | Performed by: PAIN MEDICINE

## 2022-11-07 PROCEDURE — 3008F PR BODY MASS INDEX (BMI) DOCUMENTED: ICD-10-PCS | Mod: CPTII,S$GLB,, | Performed by: PAIN MEDICINE

## 2022-11-07 PROCEDURE — 99204 OFFICE O/P NEW MOD 45 MIN: CPT | Mod: S$GLB,,, | Performed by: PAIN MEDICINE

## 2022-11-07 PROCEDURE — 3008F BODY MASS INDEX DOCD: CPT | Mod: CPTII,S$GLB,, | Performed by: PAIN MEDICINE

## 2022-11-07 NOTE — PROGRESS NOTES
Subjective:     Patient ID: Faith Johnson is a 52 y.o. female    Chief Complaint: Low-back Pain (S/P b/l L5 TFESI with pain still bothering her)      Referred by: Self, Aaareferral      HPI:    Initial Encounter (11/7/22):  Faith Johnson is a 52 y.o. female who presents today with neck and low back pain.  Low back pain started roughly 3 months ago.  No specific inciting events or injuries noted.  Pain is located the midline lower lumbar spine.  Pain does not radiate.  She denies any associated numbness, tingling, weakness, bowel bladder dysfunction.  Pain is constant worse with activities.  Pain disrupts sleep.  Patient did undergo bilateral L5 transforaminal epidural steroid injection recently.  Feels that her pain is somewhat improved but still having symptoms.  Also reports having left-sided neck and upper back pain.  This started roughly 3 weeks ago.  No specific inciting events or injuries noted.  Pain is located the left lower cervical paraspinal region, left upper trapezius, left medial scapular region.  Pain does not radiate into the upper extremity.  She denies any associated numbness, tingling, weakness, bowel bladder dysfunction..   This pain is described in detail below.    Physical Therapy:  No.    Non-pharmacologic Treatment:  Rest helps         TENS?  No    Pain Medications:         Currently taking:  Tylenol, meloxicam, tizanidine    Has tried in the past:      Has not tried:  Opioids, TCAs, SNRIs, anticonvulsants, topical creams    Blood thinners:  None    Interventional Therapies:   10/26/22 - bilateral L5 transforaminal epidural steroid injections - minimal relief noted    Relevant Surgeries:  None    Affecting sleep?  Yes    Affecting daily activities? yes    Depressive symptoms? no          SI/HI? No    Work status: Employed    Pain Scores:    Best:       5/10  Worst:     8/10  Usually:   6/10  Today:    5/10    Pain Disability Index  Family/Home Responsibilities:: 6  Recreation::  7  Social Activity:: 6  Occupation:: 6  Sexual Behavior:: 6  Self Care:: 5  Life-Support Activities:: 5  Pain Disability Index (PDI): 41    Review of Systems   Constitutional:  Negative for activity change, appetite change, chills, fatigue, fever and unexpected weight change.   HENT:  Negative for hearing loss.    Eyes:  Negative for visual disturbance.   Respiratory:  Negative for chest tightness and shortness of breath.    Cardiovascular:  Negative for chest pain.   Gastrointestinal:  Negative for abdominal pain, constipation, diarrhea, nausea and vomiting.   Genitourinary:  Negative for difficulty urinating.   Musculoskeletal:  Positive for arthralgias, back pain, myalgias, neck pain and neck stiffness. Negative for gait problem.   Skin:  Negative for rash.   Neurological:  Negative for dizziness, weakness, light-headedness, numbness and headaches.   Psychiatric/Behavioral:  Positive for sleep disturbance. Negative for hallucinations and suicidal ideas. The patient is not nervous/anxious.      Past Medical History:   Diagnosis Date    Cervical spondylosis 2022    Hypertension     Hypothyroidism     Mitral valve prolapse        Past Surgical History:   Procedure Laterality Date     SECTION      CHOLECYSTECTOMY      EPIDURAL STEROID INJECTION Bilateral 10/26/2022    Procedure: Bilateral L5 Transforaminal Epidural Steroid Injections (ref: Floyd);  Surgeon: Juan Conrad Jr., MD;  Location: [facility];  Service: Pain Management;  Laterality: Bilateral;  @1245  No ATC or DM  Needs Consent    plantar fasciitis         Social History     Socioeconomic History    Marital status:    Tobacco Use    Smoking status: Never    Smokeless tobacco: Never   Substance and Sexual Activity    Alcohol use: Yes     Comment: rare    Drug use: No    Sexual activity: Not Currently     Partners: Male     Birth control/protection: I.U.D.       Review of patient's allergies indicates:   Allergen Reactions    Codeine  "       Current Outpatient Medications on File Prior to Visit   Medication Sig Dispense Refill    acetaminophen (TYLENOL) 650 MG TbSR Take 1,300 mg by mouth every 8 (eight) hours.      estradioL (ESTRACE) 1 MG tablet Take 1 tablet (1 mg total) by mouth once daily. 30 tablet 0    levothyroxine (SYNTHROID) 100 MCG tablet Take 1 tablet by mouth once daily 90 tablet 3    meloxicam (MOBIC) 7.5 MG tablet Take 1 tablet by mouth daily 30 tablet 11    montelukast (SINGULAIR) 10 mg tablet TAKE 1 TABLET BY MOUTH EVERY EVENING 90 tablet 1    tiZANidine (ZANAFLEX) 4 MG tablet Take 1 tablet by mouth at bedtime as needed for spasm 30 tablet 0    [DISCONTINUED] drospirenone-ethinyl estradiol (OCELLA) 3-0.03 mg per tablet 1 tablet po x 3 week, skip placebo row and start new pack 84 tablet 4    [DISCONTINUED] levocetirizine (XYZAL) 5 MG tablet Take 1 tablet (5 mg total) by mouth every evening. 30 tablet 5    [DISCONTINUED] levothyroxine (SYNTHROID) 100 MCG tablet Take 100 mcg by mouth once daily.  0    [DISCONTINUED] levothyroxine (SYNTHROID) 100 MCG tablet TAKE 1 TABLET BY MOUTH ONCE DAILY 90 tablet 1    [DISCONTINUED] montelukast (SINGULAIR) 10 mg tablet Take 10 mg by mouth every evening.       Current Facility-Administered Medications on File Prior to Visit   Medication Dose Route Frequency Provider Last Rate Last Admin    levonorgestrel 20 mcg/24 hours (5 yrs) 52 mg IUD 1 Intra Uterine Device  1 Intra Uterine Device Intrauterine  Ashanti Bee MD   1 Intra Uterine Device at 05/23/19 1527       Objective:      BP (!) 169/82 (BP Location: Left arm, Patient Position: Sitting, BP Method: Large (Automatic))   Pulse 73   Ht 5' 6" (1.676 m)   Wt 80.7 kg (177 lb 14.4 oz)   SpO2 98%   BMI 28.71 kg/m²     Exam:  GEN:  Well developed, well nourished.  No acute distress.  Normal pain behavior.  HEENT:  No trauma.  Mucous membranes moist.  Nares patent bilaterally.  PSYCH: Normal affect. Thought content appropriate.  CHEST:  " Breathing symmetric.  No audible wheezing.  ABD: Soft, non-tender, non-distended.  SKIN:  Warm, pink, dry.  No rash on exposed areas.    EXT:  No cyanosis, clubbing, or edema.  No color change or changes in nail or hair growth.  NEURO/MUSCULOSKELETAL:  Fully alert, oriented, and appropriate. Speech normal ramya. No cranial nerve deficits.   Gait:  Normal.  5/5 motor strength throughout upper and lower extremities.   Sensory:  No sensory deficit in the upper and lower extremities.   Reflexes: 2+ and symmetric throughout.  Negative Randhawa's bilaterally. Downgoing Babinski bilaterally.  No clonus or spasticity.  C-Spine:  Limited ROM with pain on flexion and extension and right rotation.  Negative facet loading bilaterally.  Negative Spurling's bilaterally.    Positive TTP over left lower cervical paraspinals, left upper trapezius, left rhomboid     L-Spine:  Full ROM with pain on flexion.  Negative facet loading bilaterally.  Negative SLR bilaterally.    No TTP over lumbar paraspinals, bilateral SI joints, hips, piriformis muscles, or GTB.        Imaging:      Narrative & Impression    EXAMINATION:  MRI LUMBAR SPINE WITHOUT CONTRAST     CLINICAL HISTORY:  M51.36; Other intervertebral disc degeneration, lumbar region     TECHNIQUE:  Multiplanar, multisequence MR images were acquired from the thoracolumbar junction to the sacrum without the administration of contrast.     COMPARISON:  CT scan abdomen pelvis 2017     FINDINGS:  Focal marrow space hemangiomas involve vertebral bodies, T12-S3, largest T12, marrow space benign.  No fracture subluxation.  Minimal mild anterolateral spondylosis, disc desiccation particularly L3 and L4 disc levels.  Lower spinal cord normal.     Partially included T11-T12 minimal posterior disc bulge, slight indentation adjacent spinal sac.     T12, L1, L2 disc levels normal.     L3-L4 minimal mild posterior disc bulge, annular fissure left neural foramen, mild compression spinal sac and  adjacent caudal neural foramen, mild facet joint arthropathy hypertrophy ligamentum flavum with mild moderate spinal stenosis.     L4-L5 mild posterior disc bulge, posterior central annular fissure with minor caudal central disc extrusion about 4 mm below superior endplate of L5, mild indentation anterior spinal sac, narrowing lateral recesses, mild moderate facet joint arthropathy hypertrophy ligamentum flavum, mild moderate spinal and mild foramen stenosis.     L5-S1 limited posterior disc bulge,.  Mild moderate facet joint arthropathy without significant spinal or foramen stenosis.     Impression:     Focal caudal disc tiny extrusion superimposed on posterior disc bulge L4-5 discussed above.     Degenerative disc spondylosis facet joint arthropathy lumbosacral junction.        Electronically signed by: Eagle Man MD  Date:                                            10/05/2022  Time:                                           12:38       Assessment:       Encounter Diagnoses   Name Primary?    DDD (degenerative disc disease), lumbar Yes    DDD (degenerative disc disease), cervical     Lumbar spondylosis     Cervical spondylosis          Plan:       Faith was seen today for low-back pain.    Diagnoses and all orders for this visit:    DDD (degenerative disc disease), lumbar  -     X-Ray Cervical Spine AP Lat with Flexion  Extension; Future    DDD (degenerative disc disease), cervical  -     Ambulatory referral/consult to Physical/Occupational Therapy; Future  -     X-Ray Cervical Spine AP Lat with Flexion  Extension; Future    Lumbar spondylosis  -     X-Ray Cervical Spine AP Lat with Flexion  Extension; Future    Cervical spondylosis  -     X-Ray Cervical Spine AP Lat with Flexion  Extension; Future      Faith Johnson is a 52 y.o. female with neck and low back pain.  Low back pain is midline and likely related to disc degeneration at the L4-5 level with associated annular fissure.  I suspect pain is  discogenic in nature.  Not having overt radicular symptoms.  Low suspicion for facet mediated pain given midline location of symptoms.  Neck pain is likely related to degenerative disc disease/spondylosis.  Not having overt radicular symptoms.    Pertinent imaging studies reviewed by me. Imaging results were discussed with patient.  Cervical x-rays to get baseline imaging.  Refer to physical therapy for neck and low back.  External referral placed to bone and joint.  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  Return to clinic in 8 weeks or sooner if needed.  At that time we will discuss efficacy of physical therapy/home exercise program and review x-ray results.  May consider cervical MRI if pain persists or worsens.            This note was created by combination of typed  and M-Modal dictation. Transcription and phonetic errors may be present.  If there are any questions, please contact me.

## 2022-11-08 ENCOUNTER — APPOINTMENT (OUTPATIENT)
Dept: RADIOLOGY | Facility: HOSPITAL | Age: 52
End: 2022-11-08
Attending: PAIN MEDICINE
Payer: COMMERCIAL

## 2022-11-08 DIAGNOSIS — M47.816 LUMBAR SPONDYLOSIS: ICD-10-CM

## 2022-11-08 DIAGNOSIS — M47.812 CERVICAL SPONDYLOSIS: ICD-10-CM

## 2022-11-08 DIAGNOSIS — M51.36 DDD (DEGENERATIVE DISC DISEASE), LUMBAR: ICD-10-CM

## 2022-11-08 DIAGNOSIS — M50.30 DDD (DEGENERATIVE DISC DISEASE), CERVICAL: ICD-10-CM

## 2022-11-08 PROCEDURE — 72050 X-RAY EXAM NECK SPINE 4/5VWS: CPT | Mod: TC,FY,PN

## 2022-11-08 PROCEDURE — 72050 XR CERVICAL SPINE AP LAT WITH FLEX EXTEN: ICD-10-PCS | Mod: 26,,, | Performed by: INTERNAL MEDICINE

## 2022-11-08 PROCEDURE — 72050 X-RAY EXAM NECK SPINE 4/5VWS: CPT | Mod: 26,,, | Performed by: INTERNAL MEDICINE

## 2022-11-30 ENCOUNTER — PATIENT MESSAGE (OUTPATIENT)
Dept: OBSTETRICS AND GYNECOLOGY | Facility: CLINIC | Age: 52
End: 2022-11-30
Payer: COMMERCIAL

## 2022-11-30 DIAGNOSIS — Z12.31 BREAST CANCER SCREENING BY MAMMOGRAM: Primary | ICD-10-CM

## 2022-12-01 RX ORDER — ESTRADIOL 1 MG/1
1 TABLET ORAL DAILY
Qty: 30 TABLET | Refills: 4 | Status: SHIPPED | OUTPATIENT
Start: 2022-12-01 | End: 2022-12-22 | Stop reason: SDUPTHER

## 2022-12-01 NOTE — TELEPHONE ENCOUNTER
Called pt regarding her concerns  Reported some night sweat hot flashes but rare.  No longer taking estradiol since BTB resolved.  Discussed joint pain, weight gain, mood symptoms could due to perimenopause.  Studies show women who are taking estradiol sees improvement in their joint pain.  Advised to restart estradiol to see if she sees any improvement.  All questions answered.   MMG ordered  F/u in 5/2023 for [facility]

## 2022-12-22 RX ORDER — ESTRADIOL 1 MG/1
1 TABLET ORAL DAILY
Qty: 90 TABLET | Refills: 4 | Status: SHIPPED | OUTPATIENT
Start: 2022-12-22 | End: 2024-01-25 | Stop reason: SDUPTHER

## 2023-01-18 ENCOUNTER — CLINICAL SUPPORT (OUTPATIENT)
Dept: NUTRITION | Facility: CLINIC | Age: 53
End: 2023-01-18
Payer: COMMERCIAL

## 2023-01-18 ENCOUNTER — HOSPITAL ENCOUNTER (OUTPATIENT)
Dept: RADIOLOGY | Facility: HOSPITAL | Age: 53
Discharge: HOME | End: 2023-01-18
Attending: OBSTETRICS & GYNECOLOGY
Payer: COMMERCIAL

## 2023-01-18 VITALS — WEIGHT: 177.94 LBS | HEIGHT: 66 IN | BODY MASS INDEX: 28.6 KG/M2

## 2023-01-18 DIAGNOSIS — Z12.31 BREAST CANCER SCREENING BY MAMMOGRAM: ICD-10-CM

## 2023-01-18 DIAGNOSIS — Z71.3 DIETARY COUNSELING AND SURVEILLANCE: Primary | ICD-10-CM

## 2023-01-18 PROCEDURE — 77063 BREAST TOMOSYNTHESIS BI: CPT | Mod: 26,,, | Performed by: RADIOLOGY

## 2023-01-18 PROCEDURE — 77063 MAMMO DIGITAL SCREENING BILAT WITH TOMO: ICD-10-PCS | Mod: 26,,, | Performed by: RADIOLOGY

## 2023-01-18 PROCEDURE — 77067 MAMMO DIGITAL SCREENING BILAT WITH TOMO: ICD-10-PCS | Mod: 26,,, | Performed by: RADIOLOGY

## 2023-01-18 PROCEDURE — 77067 SCR MAMMO BI INCL CAD: CPT | Mod: 26,,, | Performed by: RADIOLOGY

## 2023-01-18 PROCEDURE — 97802 MEDICAL NUTRITION INDIV IN: CPT | Mod: 95,,, | Performed by: NUTRITIONIST

## 2023-01-18 PROCEDURE — 97802 PR MED NUTR THER, 1ST, INDIV, EA 15 MIN: ICD-10-PCS | Mod: 95,,, | Performed by: NUTRITIONIST

## 2023-01-18 PROCEDURE — 77067 SCR MAMMO BI INCL CAD: CPT | Mod: TC

## 2023-01-18 NOTE — PROGRESS NOTES
"Nutrition Assessment for Initial Medical Nutrition Therapy  The patient location is: [place]  The chief complaint leading to consultation is: goal of body fat loss    Visit type: audiovisual    Face to Face time with patient: 60 minutes of total time spent on the encounter, which includes face to face time and non-face to face time preparing to see the patient (eg, review of tests), Obtaining and/or reviewing separately obtained history, Documenting clinical information in the electronic or other health record, Independently interpreting results (not separately reported) and communicating results to the patient/family/caregiver, or Care coordination (not separately reported).         Each patient to whom he or she provides medical services by telemedicine is:  (1) informed of the relationship between the physician and patient and the respective role of any other health care provider with respect to management of the patient; and (2) notified that he or she may decline to receive medical services by telemedicine and may withdraw from such care at any time.    Reason for MNT visit: Pt in for education and nutrition counseling regarding weight loss      ASSESSMENT    Age: 52 y.o.  Wt: 177 lbs  Wt Readings from Last 1 Encounters:   01/18/23 80.7 kg (177 lb 14.6 oz)     Ht: 5'6"  Ht Readings from Last 1 Encounters:   01/18/23 5' 6" (1.676 m)     BMI: 28.7  BMI Readings from Last 1 Encounters:   01/18/23 28.72 kg/m²       Clinical Signs/Symptoms: Patient stated she gained 15 pounds over 2022. She did well with low carb and exercise during COVID    Medical History:   Past Medical History:   Diagnosis Date    Cervical spondylosis 11/7/2022    Hypertension     Hypothyroidism     Mitral valve prolapse      Problem List             Resolved    Atrophy of thyroid         Benign hypertension         Flank pain         Refractive error         Cervical spondylosis         Lumbar spondylosis         DDD (degenerative disc " disease), cervical         DDD (degenerative disc disease), lumbar            Medications:   Current Outpatient Medications:     acetaminophen (TYLENOL) 650 MG TbSR, Take 1,300 mg by mouth every 8 (eight) hours., Disp: , Rfl:     estradioL (ESTRACE) 1 MG tablet, Take 1 tablet (1 mg total) by mouth once daily., Disp: 90 tablet, Rfl: 4    levothyroxine (SYNTHROID) 100 MCG tablet, Take 1 tablet by mouth once daily, Disp: 90 tablet, Rfl: 3    meloxicam (MOBIC) 7.5 MG tablet, Take 1 tablet by mouth daily, Disp: 30 tablet, Rfl: 11    montelukast (SINGULAIR) 10 mg tablet, TAKE 1 TABLET BY MOUTH EVERY EVENING, Disp: 90 tablet, Rfl: 1    tiZANidine (ZANAFLEX) 4 MG tablet, Take 1 tablet by mouth at bedtime as needed for spasm, Disp: 30 tablet, Rfl: 0    Current Facility-Administered Medications:     levonorgestrel 20 mcg/24 hours (5 yrs) 52 mg IUD 1 Intra Uterine Device, 1 Intra Uterine Device, Intrauterine, , Ashanti Bee MD, 1 Intra Uterine Device at 05/23/19 1527    Food allergies  Intolerances: [facility]    Labs:  Reviewed and noted  No results found for: HGBA1C  Cholesterol   Date Value Ref Range Status   04/25/2022 182 120 - 199 mg/dL Final     Comment:     The National Cholesterol Education Program (NCEP) has set the  following guidelines (reference ranges) for Cholesterol:  Optimal.....................<200 mg/dL  Borderline High.............200-239 mg/dL  High........................> or = 240 mg/dL     02/21/2020 191 120 - 199 mg/dL Final     Comment:     The National Cholesterol Education Program (NCEP) has set the  following guidelines (reference ranges) for Cholesterol:  Optimal.....................<200 mg/dL  Borderline High.............200-239 mg/dL  High........................> or = 240 mg/dL     09/20/2018 197 120 - 199 mg/dL Final     Comment:     The National Cholesterol Education Program (NCEP) has set the  following guidelines (reference ranges) for Cholesterol:  Optimal.....................<200  mg/dL  Borderline High.............200-239 mg/dL  High........................> or = 240 mg/dL       Triglycerides   Date Value Ref Range Status   04/25/2022 61 30 - 150 mg/dL Final     Comment:     The National Cholesterol Education Program (NCEP) has set the  following guidelines (reference values) for triglycerides:  Normal......................<150 mg/dL  Borderline High.............150-199 mg/dL  High........................200-499 mg/dL     02/21/2020 57 30 - 150 mg/dL Final     Comment:     The National Cholesterol Education Program (NCEP) has set the  following guidelines (reference values) for triglycerides:  Normal......................<150 mg/dL  Borderline High.............150-199 mg/dL  High........................200-499 mg/dL     09/20/2018 126 30 - 150 mg/dL Final     Comment:     The National Cholesterol Education Program (NCEP) has set the  following guidelines (reference values) for triglycerides:  Normal......................<150 mg/dL  Borderline High.............150-199 mg/dL  High........................200-499 mg/dL       HDL   Date Value Ref Range Status   04/25/2022 58 40 - 75 mg/dL Final     Comment:     The National Cholesterol Education Program (NCEP) has set the  following guidelines (reference values) for HDL Cholesterol:  Low...............<40 mg/dL  Optimal...........>60 mg/dL     02/21/2020 54 40 - 75 mg/dL Final     Comment:     The National Cholesterol Education Program (NCEP) has set the  following guidelines (reference values) for HDL Cholesterol:  Low...............<40 mg/dL  Optimal...........>60 mg/dL     09/20/2018 53 40 - 75 mg/dL Final     Comment:     The National Cholesterol Education Program (NCEP) has set the  following guidelines (reference values) for HDL Cholesterol:  Low...............<40 mg/dL  Optimal...........>60 mg/dL       LDL Cholesterol   Date Value Ref Range Status   04/25/2022 111.8 63.0 - 159.0 mg/dL Final     Comment:     The National Cholesterol  Education Program (NCEP) has set the  following guidelines (reference values) for LDL Cholesterol:  Optimal.......................<130 mg/dL  Borderline High...............130-159 mg/dL  High..........................160-189 mg/dL  Very High.....................>190 mg/dL     02/21/2020 125.6 63.0 - 159.0 mg/dL Final     Comment:     The National Cholesterol Education Program (NCEP) has set the  following guidelines (reference values) for LDL Cholesterol:  Optimal.......................<130 mg/dL  Borderline High...............130-159 mg/dL  High..........................160-189 mg/dL  Very High.....................>190 mg/dL     09/20/2018 118.8 63.0 - 159.0 mg/dL Final     Comment:     The National Cholesterol Education Program (NCEP) has set the  following guidelines (reference values) for LDL Cholesterol:  Optimal.......................<130 mg/dL  Borderline High...............130-159 mg/dL  High..........................160-189 mg/dL  Very High.....................>190 mg/dL         Typical day recall: Reviewed and noted during consult     Beverages: water: 6 glasses per day; coffee daily with SF creamer and Stevia; Tea daily with   Crystal Lite    Dining out: Infrequent, 1-2 times per week    Alcohol: nonsmoker; drinks alcohol once every 3-4 months (1 Michelob ultra)    Lifestyle Influences  Support System: her     Meal Preparation  Shopping: self, spouse    Current Activity Level: walks 30 minutes most days of the week    Patient motivation, anticipated barriers, expected compliance: Patient is motivated and has verbalized understanding and intent to comply     DIAGNOSIS   Problem: Excessive Energy Intake  Etiology: RT eating too many calories  Signs/Symptoms: AEB 24 hour recall    INTERVENTION  Nutrition prescription: estimated energy requirements:   1700 calories  120-140 grams protein  100-120 grams carbs  Focus on heart healthy plant-based fats  Total fluid: 89 oz + sweat  loss      Recommendations & Goals:  Patient goals and recommendations are tailored to the specific patient's needs, readiness to change, lifestyle, culture, skills, resources, & abilities. Strategies to help achieve these nutrition-related goals were discussed which can include but are not limited to SMART goal setting & mindful eating.     Aim for a minimum of 7 hours sleep   Exercise 60 minutes most days  Eat breakfast within 1-2 hours of waking up  Try not to skip any meals or snacks, not going more than 3-4 hours without eating.   At each meal and snack, try to include a source of fiber + lean protein + healthy fat.       Written Materials Provided  These resources are intended to assist the patient in making it easier to choose recommended options when eating out & to identify better-for-you brands at the grocery store:    Meal Planning Guide with recommendations discussed along with portion sizes and a meal plan   Fueling Well On-The-Go Guide  Eat Fit Shopping Guide  RD contact information- for patient to contact regarding any questions, needs, and/or concerns that may arise     MONITORING & EVALUATION    Communicated with healthcare provider    Documented plan for referral to appropriate agency/healthcare provider as needed    Comprehension: good     Motivation to change: high     Follow-up: 1 year or prn    Counseling time: 1 Hour

## 2023-01-22 NOTE — PROGRESS NOTES
Your mammogram is normal.  A repeat mammogram in 1 year is recommended.  We will see you at your next visit.   Please call the office if you have any questions or concerns.    Take care,  Bhavya Oviedo

## 2023-01-31 ENCOUNTER — CLINICAL SUPPORT (OUTPATIENT)
Dept: OTHER | Facility: CLINIC | Age: 53
End: 2023-01-31
Payer: COMMERCIAL

## 2023-01-31 DIAGNOSIS — Z00.8 ENCOUNTER FOR OTHER GENERAL EXAMINATION: ICD-10-CM

## 2023-02-02 VITALS
DIASTOLIC BLOOD PRESSURE: 80 MMHG | WEIGHT: 179 LBS | HEIGHT: 66 IN | BODY MASS INDEX: 28.77 KG/M2 | SYSTOLIC BLOOD PRESSURE: 140 MMHG

## 2023-02-02 LAB
HDLC SERPL-MCNC: 56 MG/DL
POC CHOLESTEROL, LDL (DOCK): 119 MG/DL
POC CHOLESTEROL, TOTAL: 187 MG/DL
POC GLUCOSE, FASTING: 93 MG/DL (ref 60–110)
TRIGL SERPL-MCNC: 64 MG/DL

## 2023-02-08 ENCOUNTER — PATIENT MESSAGE (OUTPATIENT)
Dept: OBSTETRICS AND GYNECOLOGY | Facility: CLINIC | Age: 53
End: 2023-02-08
Payer: COMMERCIAL

## 2023-02-08 DIAGNOSIS — R10.32 LLQ PAIN: Primary | ICD-10-CM

## 2023-02-09 ENCOUNTER — LAB VISIT (OUTPATIENT)
Dept: LAB | Facility: HOSPITAL | Age: 53
End: 2023-02-09
Attending: OBSTETRICS & GYNECOLOGY
Payer: COMMERCIAL

## 2023-02-09 ENCOUNTER — PATIENT MESSAGE (OUTPATIENT)
Dept: OBSTETRICS AND GYNECOLOGY | Facility: CLINIC | Age: 53
End: 2023-02-09
Payer: COMMERCIAL

## 2023-02-09 DIAGNOSIS — R10.32 LLQ PAIN: ICD-10-CM

## 2023-02-09 LAB
BACTERIA #/AREA URNS HPF: ABNORMAL /HPF
BILIRUB UR QL STRIP: NEGATIVE
CLARITY UR: ABNORMAL
COLOR UR: YELLOW
GLUCOSE UR QL STRIP: NEGATIVE
HGB UR QL STRIP: ABNORMAL
HYALINE CASTS #/AREA URNS LPF: 0 /LPF
KETONES UR QL STRIP: NEGATIVE
LEUKOCYTE ESTERASE UR QL STRIP: NEGATIVE
MICROSCOPIC COMMENT: ABNORMAL
NITRITE UR QL STRIP: NEGATIVE
PH UR STRIP: 6 [PH] (ref 5–8)
PROT UR QL STRIP: ABNORMAL
RBC #/AREA URNS HPF: 4 /HPF (ref 0–4)
SP GR UR STRIP: >1.03 (ref 1–1.03)
SQUAMOUS #/AREA URNS HPF: 18 /HPF
URN SPEC COLLECT METH UR: ABNORMAL
UROBILINOGEN UR STRIP-ACNC: NEGATIVE EU/DL
WBC #/AREA URNS HPF: 6 /HPF (ref 0–5)
WBC CLUMPS URNS QL MICRO: ABNORMAL

## 2023-02-09 PROCEDURE — 81000 URINALYSIS NONAUTO W/SCOPE: CPT | Performed by: OBSTETRICS & GYNECOLOGY

## 2023-02-09 RX ORDER — NITROFURANTOIN 25; 75 MG/1; MG/1
100 CAPSULE ORAL 2 TIMES DAILY
Qty: 14 CAPSULE | Refills: 0 | Status: SHIPPED | OUTPATIENT
Start: 2023-02-09 | End: 2023-02-16

## 2023-05-01 DIAGNOSIS — I10 BENIGN HYPERTENSION: Primary | ICD-10-CM

## 2023-05-01 DIAGNOSIS — E03.4 ATROPHY OF THYROID: ICD-10-CM

## 2023-05-24 ENCOUNTER — OFFICE VISIT (OUTPATIENT)
Dept: OBSTETRICS AND GYNECOLOGY | Facility: CLINIC | Age: 53
End: 2023-05-24
Payer: COMMERCIAL

## 2023-05-24 VITALS
DIASTOLIC BLOOD PRESSURE: 80 MMHG | BODY MASS INDEX: 29.04 KG/M2 | WEIGHT: 179.88 LBS | SYSTOLIC BLOOD PRESSURE: 140 MMHG

## 2023-05-24 DIAGNOSIS — R10.32 LLQ PAIN: ICD-10-CM

## 2023-05-24 DIAGNOSIS — Z01.419 WELL WOMAN EXAM WITH ROUTINE GYNECOLOGICAL EXAM: Primary | ICD-10-CM

## 2023-05-24 DIAGNOSIS — Z30.431 IUD CHECK UP: ICD-10-CM

## 2023-05-24 DIAGNOSIS — Z12.31 BREAST CANCER SCREENING BY MAMMOGRAM: ICD-10-CM

## 2023-05-24 PROCEDURE — 3079F PR MOST RECENT DIASTOLIC BLOOD PRESSURE 80-89 MM HG: ICD-10-PCS | Mod: CPTII,S$GLB,, | Performed by: OBSTETRICS & GYNECOLOGY

## 2023-05-24 PROCEDURE — 99396 PREV VISIT EST AGE 40-64: CPT | Mod: S$GLB,,, | Performed by: OBSTETRICS & GYNECOLOGY

## 2023-05-24 PROCEDURE — 99999 PR PBB SHADOW E&M-EST. PATIENT-LVL II: CPT | Mod: PBBFAC,,, | Performed by: OBSTETRICS & GYNECOLOGY

## 2023-05-24 PROCEDURE — 3079F DIAST BP 80-89 MM HG: CPT | Mod: CPTII,S$GLB,, | Performed by: OBSTETRICS & GYNECOLOGY

## 2023-05-24 PROCEDURE — 3077F PR MOST RECENT SYSTOLIC BLOOD PRESSURE >= 140 MM HG: ICD-10-PCS | Mod: CPTII,S$GLB,, | Performed by: OBSTETRICS & GYNECOLOGY

## 2023-05-24 PROCEDURE — 99396 PR PREVENTIVE VISIT,EST,40-64: ICD-10-PCS | Mod: S$GLB,,, | Performed by: OBSTETRICS & GYNECOLOGY

## 2023-05-24 PROCEDURE — 3008F PR BODY MASS INDEX (BMI) DOCUMENTED: ICD-10-PCS | Mod: CPTII,S$GLB,, | Performed by: OBSTETRICS & GYNECOLOGY

## 2023-05-24 PROCEDURE — 3008F BODY MASS INDEX DOCD: CPT | Mod: CPTII,S$GLB,, | Performed by: OBSTETRICS & GYNECOLOGY

## 2023-05-24 PROCEDURE — 3077F SYST BP >= 140 MM HG: CPT | Mod: CPTII,S$GLB,, | Performed by: OBSTETRICS & GYNECOLOGY

## 2023-05-24 PROCEDURE — 99999 PR PBB SHADOW E&M-EST. PATIENT-LVL II: ICD-10-PCS | Mod: PBBFAC,,, | Performed by: OBSTETRICS & GYNECOLOGY

## 2023-05-24 NOTE — PROGRESS NOTES
SUBJECTIVE:   Faith Johnson is a 53 y.o. female   for annual well woman exam. No LMP recorded (exact date). Patient has had an implant..      Contraception: mirena iud since 2019 - first iud  previously on estradiol once daily since  for BTB, which resolved  Estradiol was then restarted since pt start having joints pain - which can be seen in perimenopausal female  Recently seeing Pain Management for lower back pain - epidural steroid injection  No vasomotor symptoms  + difficulty with weight lost due to inability to work out from her pain  + having intermittent pain in LLQ     Past Medical History:   Diagnosis Date    Cervical spondylosis 2022    Hypertension     Hypothyroidism     Mitral valve prolapse      Past Surgical History:   Procedure Laterality Date     SECTION      CHOLECYSTECTOMY      EPIDURAL STEROID INJECTION Bilateral 10/26/2022    Procedure: Bilateral L5 Transforaminal Epidural Steroid Injections (ref: Floyd);  Surgeon: Juan Conrad Jr., MD;  Location: [facility];  Service: Pain Management;  Laterality: Bilateral;  @1245  No ATC or DM  Needs Consent    plantar fasciitis       Social History     Socioeconomic History    Marital status:    Tobacco Use    Smoking status: Never    Smokeless tobacco: Never   Substance and Sexual Activity    Alcohol use: Yes     Comment: rare    Drug use: No    Sexual activity: Not Currently     Partners: Male     Birth control/protection: I.U.D.     Family History   Problem Relation Age of Onset    No Known Problems Mother     No Known Problems Father     No Known Problems Sister     No Known Problems Brother     No Known Problems Maternal Aunt     No Known Problems Maternal Uncle     No Known Problems Paternal Aunt     No Known Problems Paternal Uncle     Cataracts Maternal Grandmother     No Known Problems Maternal Grandfather     No Known Problems Paternal Grandmother     No Known Problems Paternal Grandfather     No Known  Problems Other     Breast cancer Neg Hx     Colon cancer Neg Hx     Ovarian cancer Neg Hx      OB History    Para Term  AB Living   1 1 1         SAB IAB Ectopic Multiple Live Births                  # Outcome Date GA Lbr Lefty/2nd Weight Sex Delivery Anes PTL Lv   1 Term               Obstetric Comments   Gynhx: reg/heavy   mirena iud placed 2019   Denies abnl pap, Pap 2019 neg   MMG 10/2019         Current Outpatient Medications   Medication Sig Dispense Refill    acetaminophen (TYLENOL) 650 MG TbSR Take 1,300 mg by mouth every 8 (eight) hours.      estradioL (ESTRACE) 1 MG tablet Take 1 tablet (1 mg total) by mouth once daily. 90 tablet 4    levothyroxine (SYNTHROID) 100 MCG tablet Take 1 tablet by mouth once daily 90 tablet 3    meloxicam (MOBIC) 7.5 MG tablet Take 1 tablet by mouth daily 30 tablet 11    montelukast (SINGULAIR) 10 mg tablet TAKE 1 TABLET BY MOUTH EVERY EVENING 90 tablet 1    tiZANidine (ZANAFLEX) 4 MG tablet Take 1 tablet by mouth at bedtime as needed for spasm 30 tablet 0    tiZANidine (ZANAFLEX) 4 MG tablet Take 1 tablet by mouth at bedtime as needed for spasm 30 tablet 0     Current Facility-Administered Medications   Medication Dose Route Frequency Provider Last Rate Last Admin    levonorgestrel 20 mcg/24 hours (5 yrs) 52 mg IUD 1 Intra Uterine Device  1 Intra Uterine Device Intrauterine  Ashanti Bee MD   1 Intra Uterine Device at 19 1527     Allergies: Codeine       ROS:  GENERAL: Denies weight gain or weight loss. Feeling well overall.   SKIN: Denies rash or lesions.   HEAD: Denies head injury or headache.   NODES: Denies enlarged lymph nodes.   CHEST: Denies chest pain or shortness of breath.   CARDIOVASCULAR: Denies palpitations or left sided chest pain.   ABDOMEN: No abdominal pain, constipation, diarrhea, nausea, vomiting or rectal bleeding.   URINARY: No frequency, dysuria, hematuria, or burning on urination.  REPRODUCTIVE: see HPI  BREASTS: The patient  performs breast self-examination and denies pain, lumps, or nipple discharge.   HEMATOLOGIC: No easy bruisability or excessive bleeding.  MUSCULOSKELETAL: see HPI  NEUROLOGIC: Denies syncope or weakness.   PSYCHIATRIC: Denies depression, anxiety or mood swings.      OBJECTIVE:   BP (!) 140/80   Wt 81.6 kg (179 lb 14.3 oz)   LMP  (Exact Date)   BMI 29.04 kg/m²   The patient appears well, alert, oriented x 3, in no distress.  PSYCH:  Normal, full range of affect  NECK: negative, no thyromegaly, trachea midline  SKIN: normal, good color, good turgor and no acne, striae, hirsutism  BREAST EXAM: breasts appear normal, no suspicious masses, no skin or nipple changes or axillary nodes  ABDOMEN: soft, non-tender; bowel sounds normal; no masses,  no organomegaly and no hernias, masses, or hepatosplenomegaly  GENITALIA: normal external genitalia, no erythema, no discharge  BLADDER: soft  URETHRA: normal appearing urethra with no masses, tenderness or lesions and normal urethra, normal urethral meatus  VAGINA: Normal  CERVIX: no lesions or cervical motion tenderness and IUD strings not seen  UTERUS: normal size, contour, position, consistency, mobility, non-tender  ADNEXA: negative for mass and on right adnexa and positive for: fullness, mass, and tenderness on right adnexa      ASSESSMENT:   .Faith was seen today for well woman.    Diagnoses and all orders for this visit:    Well woman exam with routine gynecological exam    Breast cancer screening by mammogram  -     Mammo Digital Screening Bilat w/ Manuel; Future    IUD check up        1. Health maintenance  -pap UTD  -screening MMG ordered  -counseled on exercise and healthy diet, weight loss  -bone health:  Discussed Vitamin D and Calcium supplementation, weight bearing exercises  2.  Discussed safety at home/school/work, healthy and balanced diet, sleep hygiene, as well as violence/weapons exposure.   3.  Discontinue estradiol tablet  4.  LLQ pain: check pelvic US

## 2023-06-01 ENCOUNTER — HOSPITAL ENCOUNTER (OUTPATIENT)
Dept: RADIOLOGY | Facility: HOSPITAL | Age: 53
Discharge: HOME | End: 2023-06-01
Attending: OBSTETRICS & GYNECOLOGY
Payer: COMMERCIAL

## 2023-06-01 ENCOUNTER — PATIENT MESSAGE (OUTPATIENT)
Dept: OBSTETRICS AND GYNECOLOGY | Facility: CLINIC | Age: 53
End: 2023-06-01
Payer: COMMERCIAL

## 2023-06-01 DIAGNOSIS — R10.32 LLQ PAIN: ICD-10-CM

## 2023-06-01 DIAGNOSIS — Z30.431 IUD CHECK UP: ICD-10-CM

## 2023-06-01 PROCEDURE — 76830 US PELVIS COMP WITH TRANSVAG NON-OB (XPD): ICD-10-PCS | Mod: 26,,, | Performed by: RADIOLOGY

## 2023-06-01 PROCEDURE — 76856 US EXAM PELVIC COMPLETE: CPT | Mod: 26,,, | Performed by: RADIOLOGY

## 2023-06-01 PROCEDURE — 76830 TRANSVAGINAL US NON-OB: CPT | Mod: 26,,, | Performed by: RADIOLOGY

## 2023-06-01 PROCEDURE — 76856 US EXAM PELVIC COMPLETE: CPT | Mod: TC

## 2023-06-01 PROCEDURE — 76856 US PELVIS COMP WITH TRANSVAG NON-OB (XPD): ICD-10-PCS | Mod: 26,,, | Performed by: RADIOLOGY

## 2023-06-01 NOTE — PROGRESS NOTES
Hi Faith  The IUD sitting a little low but this should still be effective, this typically does not cause pain  The ultrasound shows a small fibroid, we just monitor this as it should not be a problem when you enter menopause  Let me know if you have any questions

## 2023-10-16 ENCOUNTER — OFFICE VISIT (OUTPATIENT)
Dept: OPTOMETRY | Facility: CLINIC | Age: 53
End: 2023-10-16
Payer: COMMERCIAL

## 2023-10-16 DIAGNOSIS — Z01.00 EXAMINATION OF EYES AND VISION: Primary | ICD-10-CM

## 2023-10-16 DIAGNOSIS — H52.4 MYOPIA WITH PRESBYOPIA, BILATERAL: ICD-10-CM

## 2023-10-16 DIAGNOSIS — H52.13 MYOPIA WITH PRESBYOPIA, BILATERAL: ICD-10-CM

## 2023-10-16 PROCEDURE — 92014 PR EYE EXAM, EST PATIENT,COMPREHESV: ICD-10-PCS | Mod: S$GLB,,, | Performed by: OPTOMETRIST

## 2023-10-16 PROCEDURE — 99999 PR PBB SHADOW E&M-EST. PATIENT-LVL II: CPT | Mod: PBBFAC,,, | Performed by: OPTOMETRIST

## 2023-10-16 PROCEDURE — 99999 PR PBB SHADOW E&M-EST. PATIENT-LVL II: ICD-10-PCS | Mod: PBBFAC,,, | Performed by: OPTOMETRIST

## 2023-10-16 PROCEDURE — 92015 PR REFRACTION: ICD-10-PCS | Mod: S$GLB,,, | Performed by: OPTOMETRIST

## 2023-10-16 PROCEDURE — 92014 COMPRE OPH EXAM EST PT 1/>: CPT | Mod: S$GLB,,, | Performed by: OPTOMETRIST

## 2023-10-16 PROCEDURE — 92015 DETERMINE REFRACTIVE STATE: CPT | Mod: S$GLB,,, | Performed by: OPTOMETRIST

## 2023-10-16 NOTE — PROGRESS NOTES
Subjective:       Patient ID: Faith Johnson is a 53 y.o. female      Chief Complaint   Patient presents with    Concerns About Ocular Health     History of Present Illness  Dls: 823/22 Dr. Lay    54 y/o female  presents today with c/o blurry vision at near ou  Pt wears single vision glasses one for near and one for distance.     No tearing  No itching  No burning  No pain  No ha's  No floaters  No flashes    Eye meds  None    Pohx:  None    Fohx:  None       Assessment/Plan:     1. Examination of eyes and vision  Eyemed    2. Myopia with presbyopia, bilateral  Educated patient on refractive error and discussed lens options. Dispensed updated spectacle Rx. Educated about adaptation period to new specs.    Eyeglass Final Rx       Eyeglass Final Rx         Sphere Cylinder Add    Right -0.50 Sphere +1.75    Left -0.50 Sphere +1.75      Expiration Date: 10/16/2024                      Follow up in about 1 year (around 10/16/2024).

## 2024-01-23 ENCOUNTER — HOSPITAL ENCOUNTER (OUTPATIENT)
Dept: RADIOLOGY | Facility: HOSPITAL | Age: 54
Discharge: HOME | End: 2024-01-23
Attending: OBSTETRICS & GYNECOLOGY
Payer: COMMERCIAL

## 2024-01-23 VITALS — BODY MASS INDEX: 28.77 KG/M2 | HEIGHT: 66 IN | WEIGHT: 179 LBS

## 2024-01-23 DIAGNOSIS — Z12.31 BREAST CANCER SCREENING BY MAMMOGRAM: ICD-10-CM

## 2024-01-23 PROCEDURE — 77063 BREAST TOMOSYNTHESIS BI: CPT | Mod: 26,,, | Performed by: RADIOLOGY

## 2024-01-23 PROCEDURE — 77067 SCR MAMMO BI INCL CAD: CPT | Mod: TC

## 2024-01-23 PROCEDURE — 77067 SCR MAMMO BI INCL CAD: CPT | Mod: 26,,, | Performed by: RADIOLOGY

## 2024-01-24 NOTE — PROGRESS NOTES
Faith,  Your mammogram is normal.  A repeat mammogram in 1 year is recommended.  We will see you at your next visit.   Please call the office if you have any questions or concerns.    Take care,  Bhavya Oviedo

## 2024-01-26 RX ORDER — ESTRADIOL 1 MG/1
1 TABLET ORAL DAILY
Qty: 90 TABLET | Refills: 4 | Status: SHIPPED | OUTPATIENT
Start: 2024-01-26

## 2024-01-30 ENCOUNTER — CLINICAL SUPPORT (OUTPATIENT)
Dept: OTHER | Facility: CLINIC | Age: 54
End: 2024-01-30
Payer: COMMERCIAL

## 2024-01-30 DIAGNOSIS — Z00.8 ENCOUNTER FOR OTHER GENERAL EXAMINATION: ICD-10-CM

## 2024-01-31 VITALS
BODY MASS INDEX: 29.49 KG/M2 | HEIGHT: 65 IN | DIASTOLIC BLOOD PRESSURE: 83 MMHG | SYSTOLIC BLOOD PRESSURE: 136 MMHG | WEIGHT: 177 LBS

## 2024-01-31 LAB
HDLC SERPL-MCNC: 64 MG/DL
POC CHOLESTEROL, LDL (DOCK): 140 MG/DL
POC CHOLESTEROL, TOTAL: 217 MG/DL
POC GLUCOSE, FASTING: 105 MG/DL (ref 60–110)
TRIGL SERPL-MCNC: 76 MG/DL

## 2024-05-28 ENCOUNTER — LAB VISIT (OUTPATIENT)
Dept: LAB | Facility: HOSPITAL | Age: 54
End: 2024-05-28
Attending: INTERNAL MEDICINE
Payer: COMMERCIAL

## 2024-05-28 DIAGNOSIS — E03.4 ATROPHY OF THYROID: ICD-10-CM

## 2024-05-28 DIAGNOSIS — Z79.899 ENCOUNTER FOR LONG-TERM (CURRENT) USE OF OTHER MEDICATIONS: ICD-10-CM

## 2024-05-28 DIAGNOSIS — I10 BENIGN HYPERTENSION: ICD-10-CM

## 2024-05-28 DIAGNOSIS — I10 BENIGN HYPERTENSION: Primary | ICD-10-CM

## 2024-05-28 LAB
25(OH)D3+25(OH)D2 SERPL-MCNC: 45 NG/ML (ref 30–96)
ALBUMIN SERPL BCP-MCNC: 3.8 G/DL (ref 3.5–5.2)
ALBUMIN SERPL BCP-MCNC: 3.8 G/DL (ref 3.5–5.2)
ALP SERPL-CCNC: 76 U/L (ref 55–135)
ALP SERPL-CCNC: 76 U/L (ref 55–135)
ALT SERPL W/O P-5'-P-CCNC: 24 U/L (ref 10–44)
ALT SERPL W/O P-5'-P-CCNC: 24 U/L (ref 10–44)
ANION GAP SERPL CALC-SCNC: 6 MMOL/L (ref 8–16)
ANION GAP SERPL CALC-SCNC: 6 MMOL/L (ref 8–16)
AST SERPL-CCNC: 19 U/L (ref 10–40)
AST SERPL-CCNC: 19 U/L (ref 10–40)
BASOPHILS # BLD AUTO: 0.05 K/UL (ref 0–0.2)
BASOPHILS # BLD AUTO: 0.05 K/UL (ref 0–0.2)
BASOPHILS NFR BLD: 1 % (ref 0–1.9)
BASOPHILS NFR BLD: 1 % (ref 0–1.9)
BILIRUB SERPL-MCNC: 0.6 MG/DL (ref 0.1–1)
BILIRUB SERPL-MCNC: 0.6 MG/DL (ref 0.1–1)
BUN SERPL-MCNC: 16 MG/DL (ref 6–20)
BUN SERPL-MCNC: 16 MG/DL (ref 6–20)
CALCIUM SERPL-MCNC: 9.3 MG/DL (ref 8.7–10.5)
CALCIUM SERPL-MCNC: 9.3 MG/DL (ref 8.7–10.5)
CHLORIDE SERPL-SCNC: 108 MMOL/L (ref 95–110)
CHLORIDE SERPL-SCNC: 108 MMOL/L (ref 95–110)
CHOLEST SERPL-MCNC: 203 MG/DL (ref 120–199)
CHOLEST SERPL-MCNC: 203 MG/DL (ref 120–199)
CHOLEST/HDLC SERPL: 3.2 {RATIO} (ref 2–5)
CHOLEST/HDLC SERPL: 3.2 {RATIO} (ref 2–5)
CO2 SERPL-SCNC: 24 MMOL/L (ref 23–29)
CO2 SERPL-SCNC: 24 MMOL/L (ref 23–29)
CREAT SERPL-MCNC: 0.9 MG/DL (ref 0.5–1.4)
CREAT SERPL-MCNC: 0.9 MG/DL (ref 0.5–1.4)
DIFFERENTIAL METHOD BLD: ABNORMAL
DIFFERENTIAL METHOD BLD: ABNORMAL
EOSINOPHIL # BLD AUTO: 0.1 K/UL (ref 0–0.5)
EOSINOPHIL # BLD AUTO: 0.1 K/UL (ref 0–0.5)
EOSINOPHIL NFR BLD: 2.5 % (ref 0–8)
EOSINOPHIL NFR BLD: 2.5 % (ref 0–8)
ERYTHROCYTE [DISTWIDTH] IN BLOOD BY AUTOMATED COUNT: 12.8 % (ref 11.5–14.5)
ERYTHROCYTE [DISTWIDTH] IN BLOOD BY AUTOMATED COUNT: 12.8 % (ref 11.5–14.5)
EST. GFR  (NO RACE VARIABLE): >60 ML/MIN/1.73 M^2
EST. GFR  (NO RACE VARIABLE): >60 ML/MIN/1.73 M^2
ESTIMATED AVG GLUCOSE: 100 MG/DL (ref 68–131)
GLUCOSE SERPL-MCNC: 88 MG/DL (ref 70–110)
GLUCOSE SERPL-MCNC: 88 MG/DL (ref 70–110)
HBA1C MFR BLD: 5.1 % (ref 4–5.6)
HCT VFR BLD AUTO: 42.2 % (ref 37–48.5)
HCT VFR BLD AUTO: 42.2 % (ref 37–48.5)
HDLC SERPL-MCNC: 63 MG/DL (ref 40–75)
HDLC SERPL-MCNC: 63 MG/DL (ref 40–75)
HDLC SERPL: 31 % (ref 20–50)
HDLC SERPL: 31 % (ref 20–50)
HGB BLD-MCNC: 13.8 G/DL (ref 12–16)
HGB BLD-MCNC: 13.8 G/DL (ref 12–16)
IMM GRANULOCYTES # BLD AUTO: 0.03 K/UL (ref 0–0.04)
IMM GRANULOCYTES # BLD AUTO: 0.03 K/UL (ref 0–0.04)
IMM GRANULOCYTES NFR BLD AUTO: 0.6 % (ref 0–0.5)
IMM GRANULOCYTES NFR BLD AUTO: 0.6 % (ref 0–0.5)
LDLC SERPL CALC-MCNC: 118.8 MG/DL (ref 63–159)
LDLC SERPL CALC-MCNC: 118.8 MG/DL (ref 63–159)
LYMPHOCYTES # BLD AUTO: 1.4 K/UL (ref 1–4.8)
LYMPHOCYTES # BLD AUTO: 1.4 K/UL (ref 1–4.8)
LYMPHOCYTES NFR BLD: 27.2 % (ref 18–48)
LYMPHOCYTES NFR BLD: 27.2 % (ref 18–48)
MCH RBC QN AUTO: 30.2 PG (ref 27–31)
MCH RBC QN AUTO: 30.2 PG (ref 27–31)
MCHC RBC AUTO-ENTMCNC: 32.7 G/DL (ref 32–36)
MCHC RBC AUTO-ENTMCNC: 32.7 G/DL (ref 32–36)
MCV RBC AUTO: 92 FL (ref 82–98)
MCV RBC AUTO: 92 FL (ref 82–98)
MONOCYTES # BLD AUTO: 0.4 K/UL (ref 0.3–1)
MONOCYTES # BLD AUTO: 0.4 K/UL (ref 0.3–1)
MONOCYTES NFR BLD: 6.7 % (ref 4–15)
MONOCYTES NFR BLD: 6.7 % (ref 4–15)
NEUTROPHILS # BLD AUTO: 3.3 K/UL (ref 1.8–7.7)
NEUTROPHILS # BLD AUTO: 3.3 K/UL (ref 1.8–7.7)
NEUTROPHILS NFR BLD: 62 % (ref 38–73)
NEUTROPHILS NFR BLD: 62 % (ref 38–73)
NONHDLC SERPL-MCNC: 140 MG/DL
NONHDLC SERPL-MCNC: 140 MG/DL
NRBC BLD-RTO: 0 /100 WBC
NRBC BLD-RTO: 0 /100 WBC
PLATELET # BLD AUTO: 246 K/UL (ref 150–450)
PLATELET # BLD AUTO: 246 K/UL (ref 150–450)
PMV BLD AUTO: 9.9 FL (ref 9.2–12.9)
PMV BLD AUTO: 9.9 FL (ref 9.2–12.9)
POTASSIUM SERPL-SCNC: 4.2 MMOL/L (ref 3.5–5.1)
POTASSIUM SERPL-SCNC: 4.2 MMOL/L (ref 3.5–5.1)
PROT SERPL-MCNC: 6.9 G/DL (ref 6–8.4)
PROT SERPL-MCNC: 6.9 G/DL (ref 6–8.4)
RBC # BLD AUTO: 4.57 M/UL (ref 4–5.4)
RBC # BLD AUTO: 4.57 M/UL (ref 4–5.4)
SODIUM SERPL-SCNC: 138 MMOL/L (ref 136–145)
SODIUM SERPL-SCNC: 138 MMOL/L (ref 136–145)
T4 FREE SERPL-MCNC: 1.08 NG/DL (ref 0.71–1.51)
T4 FREE SERPL-MCNC: 1.08 NG/DL (ref 0.71–1.51)
TRIGL SERPL-MCNC: 106 MG/DL (ref 30–150)
TRIGL SERPL-MCNC: 106 MG/DL (ref 30–150)
TSH SERPL DL<=0.005 MIU/L-ACNC: 0.6 UIU/ML (ref 0.4–4)
TSH SERPL DL<=0.005 MIU/L-ACNC: 0.6 UIU/ML (ref 0.4–4)
WBC # BLD AUTO: 5.26 K/UL (ref 3.9–12.7)
WBC # BLD AUTO: 5.26 K/UL (ref 3.9–12.7)

## 2024-05-28 PROCEDURE — 82306 VITAMIN D 25 HYDROXY: CPT | Performed by: INTERNAL MEDICINE

## 2024-05-28 PROCEDURE — 80061 LIPID PANEL: CPT | Performed by: INTERNAL MEDICINE

## 2024-05-28 PROCEDURE — 36415 COLL VENOUS BLD VENIPUNCTURE: CPT | Performed by: INTERNAL MEDICINE

## 2024-05-28 PROCEDURE — 80053 COMPREHEN METABOLIC PANEL: CPT | Performed by: INTERNAL MEDICINE

## 2024-05-28 PROCEDURE — 84443 ASSAY THYROID STIM HORMONE: CPT | Performed by: INTERNAL MEDICINE

## 2024-05-28 PROCEDURE — 84439 ASSAY OF FREE THYROXINE: CPT | Performed by: INTERNAL MEDICINE

## 2024-05-28 PROCEDURE — 83036 HEMOGLOBIN GLYCOSYLATED A1C: CPT | Performed by: INTERNAL MEDICINE

## 2024-05-28 PROCEDURE — 85025 COMPLETE CBC W/AUTO DIFF WBC: CPT | Performed by: INTERNAL MEDICINE

## 2024-08-08 ENCOUNTER — OFFICE VISIT (OUTPATIENT)
Dept: OBSTETRICS AND GYNECOLOGY | Facility: CLINIC | Age: 54
End: 2024-08-08
Payer: COMMERCIAL

## 2024-08-08 VITALS — WEIGHT: 186.5 LBS | DIASTOLIC BLOOD PRESSURE: 80 MMHG | SYSTOLIC BLOOD PRESSURE: 120 MMHG | BODY MASS INDEX: 31.04 KG/M2

## 2024-08-08 DIAGNOSIS — Z12.31 BREAST CANCER SCREENING BY MAMMOGRAM: ICD-10-CM

## 2024-08-08 DIAGNOSIS — L29.9 GENERALIZED PRURITUS: ICD-10-CM

## 2024-08-08 DIAGNOSIS — Z30.431 IUD CHECK UP: ICD-10-CM

## 2024-08-08 DIAGNOSIS — Z01.419 WELL WOMAN EXAM WITH ROUTINE GYNECOLOGICAL EXAM: Primary | ICD-10-CM

## 2024-08-08 PROCEDURE — 99999 PR PBB SHADOW E&M-EST. PATIENT-LVL III: CPT | Mod: PBBFAC,,, | Performed by: OBSTETRICS & GYNECOLOGY

## 2024-08-08 PROCEDURE — 3008F BODY MASS INDEX DOCD: CPT | Mod: CPTII,S$GLB,, | Performed by: OBSTETRICS & GYNECOLOGY

## 2024-08-08 PROCEDURE — 3044F HG A1C LEVEL LT 7.0%: CPT | Mod: CPTII,S$GLB,, | Performed by: OBSTETRICS & GYNECOLOGY

## 2024-08-08 PROCEDURE — 1159F MED LIST DOCD IN RCRD: CPT | Mod: CPTII,S$GLB,, | Performed by: OBSTETRICS & GYNECOLOGY

## 2024-08-08 PROCEDURE — 3079F DIAST BP 80-89 MM HG: CPT | Mod: CPTII,S$GLB,, | Performed by: OBSTETRICS & GYNECOLOGY

## 2024-08-08 PROCEDURE — 99396 PREV VISIT EST AGE 40-64: CPT | Mod: S$GLB,,, | Performed by: OBSTETRICS & GYNECOLOGY

## 2024-08-08 PROCEDURE — 3074F SYST BP LT 130 MM HG: CPT | Mod: CPTII,S$GLB,, | Performed by: OBSTETRICS & GYNECOLOGY

## 2024-08-08 RX ORDER — HYDROXYZINE HYDROCHLORIDE 25 MG/1
25 TABLET, FILM COATED ORAL 3 TIMES DAILY PRN
Qty: 30 TABLET | Refills: 4 | Status: SHIPPED | OUTPATIENT
Start: 2024-08-08

## 2024-08-08 RX ORDER — ESTRADIOL 1 MG/1
1 TABLET ORAL DAILY
Qty: 90 TABLET | Refills: 4 | Status: SHIPPED | OUTPATIENT
Start: 2024-08-08

## 2024-08-08 NOTE — PROGRESS NOTES
SUBJECTIVE:   Faith Johnson is a 54 y.o. female   for annual well woman exam. No LMP recorded. Patient has had an implant..      Contraception: mirena iud since 2019 - first iud  previously on estradiol once daily since  for BTB, which resolved, still taking estradiol  No hot flashes  + weight gain  + has had generalized itching for the past few months  CMP in 2024 was NL   Tried different lotion without relief  + anxious, attributed it to itching       EXAMINATION:  US PELVIS COMP WITH TRANSVAG NON-OB (XPD)     CLINICAL HISTORY:  LLQ pain, lost iud strings;  Encounter for routine checking of intrauterine contraceptive device     FINDINGS:  The uterus measures 8.1 x 4.4 x 5.4 cm, endometrium 3 mm.     Right ovary measures 1.5 x 1.4 x 1.5 cm.     Left ovary measures 1.9 x 0.6 x 2.6 cm.  There is a low lying IUD in the lower uterine segment.     There is a fibroid measuring 2.2 cm.     Impression:     IUD is in the lower uterine segment.     Small uterine leiomyoma.        Electronically signed by:Jorge Marie MD  Date:                                            2023  Time:                                           10:21    Past Medical History:   Diagnosis Date    Cervical spondylosis 2022    Hypertension     Hypothyroidism     Mitral valve prolapse      Past Surgical History:   Procedure Laterality Date     SECTION      CHOLECYSTECTOMY      EPIDURAL STEROID INJECTION Bilateral 10/26/2022    Procedure: Bilateral L5 Transforaminal Epidural Steroid Injections (ref: Floyd);  Surgeon: Juan Conrad Jr., MD;  Location: [facility];  Service: Pain Management;  Laterality: Bilateral;  @1245  No ATC or DM  Needs Consent    plantar fasciitis       Social History     Socioeconomic History    Marital status:    Tobacco Use    Smoking status: Never    Smokeless tobacco: Never   Substance and Sexual Activity    Alcohol use: Yes     Comment: rare    Drug use: No    Sexual  activity: Not Currently     Partners: Male     Birth control/protection: I.U.D.     Social Determinants of Health     Financial Resource Strain: Low Risk  (2024)    Overall Financial Resource Strain (CARDIA)     Difficulty of Paying Living Expenses: Not hard at all   Food Insecurity: No Food Insecurity (2024)    Hunger Vital Sign     Worried About Running Out of Food in the Last Year: Never true     Ran Out of Food in the Last Year: Never true   Transportation Needs: No Transportation Needs (2024)    [facility]    [facility]     Lack of Transportation (Medical): No     Lack of Transportation (Non-Medical): No   Physical Activity: Insufficiently Active (2024)    Exercise Vital Sign     Days of Exercise per Week: 3 days     Minutes of Exercise per Session: 30 min   Stress: Stress Concern Present (2024)    [employer] - Occupational Stress Questionnaire     Feeling of Stress : Very much   Housing Stability: Unknown (2024)    Housing Stability Vital Sign     Unable to Pay for Housing in the Last Year: No     Family History   Problem Relation Name Age of Onset    No Known Problems Mother      No Known Problems Father      No Known Problems Sister      No Known Problems Brother      No Known Problems Maternal Aunt      No Known Problems Maternal Uncle      No Known Problems Paternal Aunt      No Known Problems Paternal Uncle      Cataracts Maternal Grandmother      No Known Problems Maternal Grandfather      No Known Problems Paternal Grandmother      No Known Problems Paternal Grandfather      No Known Problems Other      Breast cancer Neg Hx      Colon cancer Neg Hx      Ovarian cancer Neg Hx       OB History    Para Term  AB Living   1 1 1         SAB IAB Ectopic Multiple Live Births                  # Outcome Date GA Lbr Lefty/2nd Weight Sex Type Anes PTL Lv   1 Term               Obstetric Comments   Gynhx: reg/heavy    mirena iud placed 5/2019   Denies abnl pap, Pap 4/2019 neg   MMG 10/2019         Current Outpatient Medications   Medication Sig Dispense Refill    EScitalopram oxalate (LEXAPRO) 10 MG tablet Take 1 tablet by mouth daily 90 tablet 1    estradioL (ESTRACE) 1 MG tablet Take 1 tablet (1 mg total) by mouth once daily. 90 tablet 4    fluticasone propionate (FLONASE) 50 mcg/actuation nasal spray Use 1 spray in each nostril daily 16 g 0    levothyroxine (SYNTHROID) 100 MCG tablet Take 1 tablet by mouth once daily 90 tablet 3    meloxicam (MOBIC) 7.5 MG tablet Take 1 tablet by mouth daily 30 tablet 11    montelukast (SINGULAIR) 10 mg tablet TAKE 1 TABLET BY MOUTH EVERY EVENING 90 tablet 1    ondansetron (ZOFRAN) 4 MG tablet Take 1 tablet by mouth every 8 hours as needed for nausea/vomiting 20 tablet 1    tiZANidine (ZANAFLEX) 4 MG tablet Take 1 tablet by mouth at bedtime as needed for spasm 30 tablet 0    tiZANidine (ZANAFLEX) 4 MG tablet Take 1 tablet by mouth at bedtime as needed for spasm 30 tablet 0     Current Facility-Administered Medications   Medication Dose Route Frequency Provider Last Rate Last Admin    levonorgestrel 20 mcg/24 hours (5 yrs) 52 mg IUD 1 Intra Uterine Device  1 Intra Uterine Device Intrauterine  Ashanti Bee MD   1 Intra Uterine Device at 05/23/19 1527     Allergies: Codeine       ROS:  GENERAL: Denies weight gain or weight loss. Feeling well overall.   SKIN: Denies rash or lesions.   HEAD: Denies head injury or headache.   NODES: Denies enlarged lymph nodes.   CHEST: Denies chest pain or shortness of breath.   CARDIOVASCULAR: Denies palpitations or left sided chest pain.   ABDOMEN: No abdominal pain, constipation, diarrhea, nausea, vomiting or rectal bleeding.   URINARY: No frequency, dysuria, hematuria, or burning on urination.  REPRODUCTIVE: see HPI  BREASTS: The patient performs breast self-examination and denies pain, lumps, or nipple discharge.   HEMATOLOGIC: No easy bruisability  or excessive bleeding.  MUSCULOSKELETAL: see HPI  NEUROLOGIC: Denies syncope or weakness.   PSYCHIATRIC: Denies depression, anxiety or mood swings.      OBJECTIVE:   /80   Wt 84.6 kg (186 lb 8.2 oz)   BMI 31.04 kg/m²   The patient appears well, alert, oriented x 3, in no distress.  PSYCH:  Normal, full range of affect  NECK: negative, no thyromegaly, trachea midline  SKIN: normal, good color, good turgor and no acne, striae, hirsutism  BREAST EXAM: breasts appear normal, no suspicious masses, no skin or nipple changes or axillary nodes  ABDOMEN: soft, non-tender; bowel sounds normal; no masses,  no organomegaly and no hernias, masses, or hepatosplenomegaly  GENITALIA: normal external genitalia, no erythema, no discharge  BLADDER: soft  URETHRA: normal appearing urethra with no masses, tenderness or lesions and normal urethra, normal urethral meatus  VAGINA: Normal  CERVIX: no lesions or cervical motion tenderness and IUD strings seen at tip  UTERUS: normal size, contour, position, consistency, mobility, non-tender  ADNEXA: negative for mass and on right adnexa and positive for: fullness, mass, and tenderness on right adnexa      ASSESSMENT:   .Faith was seen today for well woman.    Diagnoses and all orders for this visit:    Well woman exam with routine gynecological exam    IUD check up    Breast cancer screening by mammogram  -     Mammo Digital Screening Bilat w/ Manuel; Future          1. Health maintenance  -pap 5/2022 neg/hpv neg and UTD  -screening MMG neg 1/2024  -counseled on exercise and healthy diet, weight loss  -bone health:  Discussed Vitamin D and Calcium supplementation, weight bearing exercises  2.  Discussed safety at home/school/work, healthy and balanced diet, sleep hygiene, as well as violence/weapons exposure.   3.  Refilled on estradiol  4. Plan to remove mirena next year @ age 55  5. Generalized itching:  rx for atarax.  Check CMP, bilirubin, advised to f/u with PCP

## 2024-08-12 ENCOUNTER — PATIENT MESSAGE (OUTPATIENT)
Dept: OBSTETRICS AND GYNECOLOGY | Facility: CLINIC | Age: 54
End: 2024-08-12
Payer: COMMERCIAL

## 2024-08-15 ENCOUNTER — LAB VISIT (OUTPATIENT)
Dept: LAB | Facility: HOSPITAL | Age: 54
End: 2024-08-15
Attending: OBSTETRICS & GYNECOLOGY
Payer: COMMERCIAL

## 2024-08-15 DIAGNOSIS — L29.9 GENERALIZED PRURITUS: ICD-10-CM

## 2024-08-15 LAB
ALBUMIN SERPL BCP-MCNC: 3.7 G/DL (ref 3.5–5.2)
ALP SERPL-CCNC: 65 U/L (ref 55–135)
ALT SERPL W/O P-5'-P-CCNC: 13 U/L (ref 10–44)
ANION GAP SERPL CALC-SCNC: 9 MMOL/L (ref 8–16)
AST SERPL-CCNC: 15 U/L (ref 10–40)
BASOPHILS # BLD AUTO: 0.03 K/UL (ref 0–0.2)
BASOPHILS NFR BLD: 0.6 % (ref 0–1.9)
BILIRUB DIRECT SERPL-MCNC: 0.2 MG/DL (ref 0.1–0.3)
BILIRUB SERPL-MCNC: 0.5 MG/DL (ref 0.1–1)
BUN SERPL-MCNC: 18 MG/DL (ref 6–20)
CALCIUM SERPL-MCNC: 9.3 MG/DL (ref 8.7–10.5)
CHLORIDE SERPL-SCNC: 109 MMOL/L (ref 95–110)
CO2 SERPL-SCNC: 23 MMOL/L (ref 23–29)
CREAT SERPL-MCNC: 0.8 MG/DL (ref 0.5–1.4)
DIFFERENTIAL METHOD BLD: ABNORMAL
EOSINOPHIL # BLD AUTO: 0.2 K/UL (ref 0–0.5)
EOSINOPHIL NFR BLD: 3.4 % (ref 0–8)
ERYTHROCYTE [DISTWIDTH] IN BLOOD BY AUTOMATED COUNT: 12.8 % (ref 11.5–14.5)
EST. GFR  (NO RACE VARIABLE): >60 ML/MIN/1.73 M^2
GLUCOSE SERPL-MCNC: 105 MG/DL (ref 70–110)
HCT VFR BLD AUTO: 41.5 % (ref 37–48.5)
HGB BLD-MCNC: 14 G/DL (ref 12–16)
IMM GRANULOCYTES # BLD AUTO: 0.01 K/UL (ref 0–0.04)
IMM GRANULOCYTES NFR BLD AUTO: 0.2 % (ref 0–0.5)
LYMPHOCYTES # BLD AUTO: 1.3 K/UL (ref 1–4.8)
LYMPHOCYTES NFR BLD: 27.6 % (ref 18–48)
MCH RBC QN AUTO: 31.3 PG (ref 27–31)
MCHC RBC AUTO-ENTMCNC: 33.7 G/DL (ref 32–36)
MCV RBC AUTO: 93 FL (ref 82–98)
MONOCYTES # BLD AUTO: 0.4 K/UL (ref 0.3–1)
MONOCYTES NFR BLD: 8.5 % (ref 4–15)
NEUTROPHILS # BLD AUTO: 2.8 K/UL (ref 1.8–7.7)
NEUTROPHILS NFR BLD: 59.7 % (ref 38–73)
NRBC BLD-RTO: 0 /100 WBC
PLATELET # BLD AUTO: 244 K/UL (ref 150–450)
PMV BLD AUTO: 9.9 FL (ref 9.2–12.9)
POTASSIUM SERPL-SCNC: 4.2 MMOL/L (ref 3.5–5.1)
PROT SERPL-MCNC: 6.5 G/DL (ref 6–8.4)
RBC # BLD AUTO: 4.48 M/UL (ref 4–5.4)
SODIUM SERPL-SCNC: 141 MMOL/L (ref 136–145)
WBC # BLD AUTO: 4.71 K/UL (ref 3.9–12.7)

## 2024-08-15 PROCEDURE — 85025 COMPLETE CBC W/AUTO DIFF WBC: CPT | Performed by: OBSTETRICS & GYNECOLOGY

## 2024-08-15 PROCEDURE — 82248 BILIRUBIN DIRECT: CPT | Performed by: OBSTETRICS & GYNECOLOGY

## 2024-08-15 PROCEDURE — 80053 COMPREHEN METABOLIC PANEL: CPT | Performed by: OBSTETRICS & GYNECOLOGY

## 2024-08-15 PROCEDURE — 36415 COLL VENOUS BLD VENIPUNCTURE: CPT | Performed by: OBSTETRICS & GYNECOLOGY

## 2024-08-21 ENCOUNTER — CLINICAL SUPPORT (OUTPATIENT)
Dept: NUTRITION | Facility: CLINIC | Age: 54
End: 2024-08-21
Payer: COMMERCIAL

## 2024-08-21 DIAGNOSIS — Z71.3 DIETARY COUNSELING AND SURVEILLANCE: Primary | ICD-10-CM

## 2024-08-21 PROCEDURE — 97803 MED NUTRITION INDIV SUBSEQ: CPT | Mod: 95,,, | Performed by: NUTRITIONIST

## 2024-08-21 NOTE — PROGRESS NOTES
"Nutrition Assessment    Visit Type: employee wellness consult  Session Time:  1 Hour  Reason for MNT visit: Pt in for education and nutrition counseling regarding Obesity, goal of weight loss  The patient location is: her home  The chief complaint leading to consultation is: goal of body fat loss    Visit type: audiovisual    Face to Face time with patient: 53  60 minutes of total time spent on the encounter, which includes face to face time and non-face to face time preparing to see the patient (eg, review of tests), Obtaining and/or reviewing separately obtained history, Documenting clinical information in the electronic or other health record, Independently interpreting results (not separately reported) and communicating results to the patient/family/caregiver, or Care coordination (not separately reported).       Each patient to whom he or she provides medical services by telemedicine is:  (1) informed of the relationship between the physician and patient and the respective role of any other health care provider with respect to management of the patient; and (2) notified that he or she may decline to receive medical services by telemedicine and may withdraw from such care at any time.      Age: 54 y.o.  Wt:   Wt Readings from Last 1 Encounters:   08/08/24 84.6 kg (186 lb 8.2 oz)     Ht:   Ht Readings from Last 1 Encounters:   01/30/24 5' 5" (1.651 m)     BMI:   BMI Readings from Last 1 Encounters:   08/08/24 31.04 kg/m²       Client states:  Last seen in January 2023. She and her  want to eat healthy and lose weight. She wants to watch her carb and sugar intake and discuss rewards on the weekend. Dealing with some menopause/hormone issues. Reports losing 55 pounds from 6024-1099 and is slowly gaining the weight back since then. Thinking about joining digital medicine program at [facility]. I highly encourage her to try it out and see how she likes it    Medical History  Problem List             Resolved    " Atrophy of thyroid         Benign hypertension         Flank pain         Refractive error         Cervical spondylosis         Lumbar spondylosis         DDD (degenerative disc disease), cervical         DDD (degenerative disc disease), lumbar         Encounter for long-term (current) use of other medications            Past Medical History:   Diagnosis Date    Cervical spondylosis 2022    Hypertension     Hypothyroidism     Mitral valve prolapse        Past Surgical History:   Procedure Laterality Date     SECTION      CHOLECYSTECTOMY      EPIDURAL STEROID INJECTION Bilateral 10/26/2022    Procedure: Bilateral L5 Transforaminal Epidural Steroid Injections (ref: Floyd);  Surgeon: Juan Conrad Jr., MD;  Location: [facility];  Service: Pain Management;  Laterality: Bilateral;  @1245  No ATC or DM  Needs Consent    plantar fasciitis            Medications    Prior to Admission medications    Medication Sig Start Date End Date Taking? Authorizing Provider   EScitalopram oxalate (LEXAPRO) 10 MG tablet Take 1 tablet by mouth daily 3/25/24      estradioL (ESTRACE) 1 MG tablet Take 1 tablet (1 mg total) by mouth once daily. 24   Ras Oviedo MD   fluticasone propionate (FLONASE) 50 mcg/actuation nasal spray Use 1 spray in each nostril daily 24      hydrOXYzine HCL (ATARAX) 25 MG tablet Take 1 tablet (25 mg total) by mouth 3 (three) times daily as needed for Itching or Anxiety. 24   Ras Oviedo MD   levothyroxine (SYNTHROID) 100 MCG tablet Take 1 tablet by mouth once daily 3/19/24      meloxicam (MOBIC) 7.5 MG tablet Take 1 tablet by mouth daily 23      montelukast (SINGULAIR) 10 mg tablet TAKE 1 TABLET BY MOUTH EVERY EVENING 21   Janelle Medeiros MD   ondansetron (ZOFRAN) 4 MG tablet Take 1 tablet by mouth every 8 hours as needed for nausea/vomiting 23   Janelle Medeiros MD   tiZANidine (ZANAFLEX) 4 MG tablet Take 1 tablet by mouth at bedtime as needed for  spasm 10/24/22      tiZANidine (ZANAFLEX) 4 MG tablet Take 1 tablet by mouth at bedtime as needed for spasm 4/20/23           Food Allergies or Intolerances:  NKFA     Social History    Marital status:      Social History     Tobacco Use    Smoking status: Never    Smokeless tobacco: Never   Substance Use Topics    Alcohol use: Yes     Comment: rare     Current Alcohol use: every other month (1/2 glass of red wine or a low carb beer)    Lab Reports:  Reviewed and noted     Lab Results   Component Value Date    [id number] 45 05/28/2024    TSH 0.601 05/28/2024    TSH 0.601 05/28/2024    FREET4 1.08 05/28/2024    FREET4 1.08 05/28/2024    CRP 1.2 05/09/2022    SEDRATE 4 05/09/2022    AST 15 08/15/2024    ALT 13 08/15/2024    HDL 63 05/28/2024    HDL 63 05/28/2024    LDLCALC 118.8 05/28/2024    LDLCALC 118.8 05/28/2024    TRIG 106 05/28/2024    TRIG 106 05/28/2024    HGBA1C 5.1 05/28/2024         BP Readings from Last 1 Encounters:   08/08/24 120/80        24-hour Recall:     Food choices (After Midnight)  Patient eating midnight to 4am: (P) Never     Food choices (Early Morning)  Patient eats 4am to 8am: (P) Never          Food choices (Morning)  Patient eats 8am to noon: (P) Once or twice a week       Fast food meals (8am - midnight): (P) none     Food choices (Afternoon)  Patient eats noon to 4pm: (P) Once or twice a week   Home cooked meals (Noon - 4pm): (P) Cheese, nuts, eggs, fruit, leftover grilled meats   Snacks (Noon to 4pm): (P) Protein Shakes     Food choices (Evening)   Patient eats 4pm to 8pm: (P) Every day   Home cooked meals (4pm - 8pm): (P) Grilled meats and veggies   Fast food meals (4pm - 8pm): (P) none   Snacks (4pm - 8pm): (P) Cheese, nuts, eggs, fruit     Food choices (Late evening)  R [facility] PEQ NUTRITION HOW OFTEN EATING LATE EVENING: (P) Once or twice a month   Home cooked meals (8pm - midnight): (P) Cheese, nuts, eggs, fruit, leftover grilled meats   Fast food meals (8pm - midnight): (P)  none   Snacks (8pm - midnight: (P) Protein Shakes, Cheese, nuts, eggs, fruit      Beverages:  How much water consumed (per day?): (P) 6   Cups of milk consumed (per day?): (P) 0       Cups of  juice consumed (per day?): (P) 0   Number of supplement shakes (per day?): (P) 0       Number of cups of coffee (per day?): (P) 0         Cups of soda consumed (per day?): (P) 0   Other non-alcoholic beverages consumed (per day?): (P) 2   Types of other beverages: (P) Crystal light      LIFESTYLE FACTORS    Dinning out: 1-2 x per week    Meal preparation/shopping: Who does the grocery shopping:: (P) Self Who prepares the meals: (P) Self     Sleep: fair, but getting better since started a small dose of hormones     Stress Level: low    Support System:  spouse    Exercise Regimen: walks for 35 minutes with her  everyday. She wants to add weight bearing exercises a few days a week      Diagnosis    Excessive energy intake related to not always eating every 3-4 hours as evidenced by 24 hour recall.      Intervention    Estimated Energy Requirements:   Calories: 1,800 kcal  Protein: 135 grams  Carbohydrates: 135 grams  Total Fat: 80 grams   Baseline for fluids: 90 oz + sweat loss    Recommendations & Goals:  Patient goals and recommendations are tailored to the specific patient's needs, readiness to change, lifestyle, culture, skills, resources, & abilities. Strategies to help achieve these nutrition-related goals were discussed which can include but are not limited to SMART goal setting & mindful eating.     Aim for a minimum of 7 hours sleep   Exercise 60 minutes most days  Eat breakfast within 1-2 hours of waking up  Try not to skip any meals or snacks, not going more than 3-4 hours without eating   At each meal and snack, try to include a source of fiber + lean protein + healthy fat     Written Materials Provided  These resources are intended to assist the patient in making it easier to choose recommended options when  "eating out & to identify better-for-you brands at the grocery store:    Meal Planning Guide with recommendations discussed along with portion sizes and a customized meal plan   Fueling Well On-the-Go Food Guide  Eat Fit Shopping Guide  Lifestyle Nutrition Meal Guide  RD contact information    Goals:   - Eat every 3-4 hours  - Increase water intake  - Exercise: Continue walking, but I love how you want to add more weight bearing exercises a few days per week  - Continue low carb at dinner time (6 oz lean protein + non-starchy veggies)  - Mexican: If having chips, put 1 handful on your plate and enjoy. Main entree would be fajita's with no tortillas (or have 1 tortilla if not having chips)  - Nuts: 1/4 cup nuts if having for a snack. If having after dinner, then 1/8 cup  -Keep snacks at work for busy days (Jiff to go PB cups, Landon's nut butter individual packets, protein shakes,   -"Josh # 7 Rule": Aisles of grocery stores; look for brands that have <7 grams added sugar and at least 7 grams protein or more (if protein bars and shakes aim for at least 20 grams protein)  - Deep fried food: Enjoy at most twice a month (or a 'fun/cheat' meal)  -Breakfast: Add up to 2 servings of carbs = 30-40 grams carbs for your breakfast and lunch (Refer to fruit page and starches & breads page of meal planning guide book to see what 1 serving of carbs = 15-20 grams is for portions  -Salads: Any non-starchy veggies + 6 oz lean protein + 2 fats each being ~2 tablespoons (dressing, cheese, nuts, avocado)  - 1 serving of whole wheat crackers -or- 1 piece of fruit with protein/fat (2 tablespoons PB, 2 oz cheese) is a great snack  - Try Simply Delish Keto pudding mix  - Try palmini hearts of palm 'pasta' (get it in a bag/pouch and not the can)  - Mix half brown rice (1/4 cup cooked portion) with riced cauliflower  - [employer] gourmet --> frozen pre made cauliflower 'pizza' crusts  -Try different carb alternatives with cottage " cheese  -Take your Vitamin D3/Vitamin K every other day instead of every day since vitamin D in your capsule is 10,000 international units. Get your Vitamin D rechecked in about 3-4 months to see if we need to adjust dosage  -Reta: low carb bread found at [facility] (no sugar added) or unsweetened jelly is great! (Polaner's All Fruit preserves is a great brand)      Monitoring/Evaluation    Monitor the following:  Weight  Sleep  Stress Management  Movement  Nutrient intake in reference to meal plan    Communicated with healthcare provider and documented plan for referral to appropriate agency/healthcare provider as needed    Supervising Physician: Joe Dueñas MD    Patient motivation, anticipated barriers, expected compliance: Patient is motivated and has verbalized understanding and intent to comply.     Comprehension: good     Follow-up: 6-8 weeks or prn

## 2024-08-21 NOTE — PATIENT INSTRUCTIONS
"    Name: Faith Johnson   Date: 08/21/2024    Recommended daily energy requirements to reach your goals:  Calories: 1,800 kcal  Protein: 135 grams  Carbohydrates: 135 grams  Total Fat: 80 grams   Baseline for fluids: 90 oz + sweat loss      Client Goals:  - Eat every 3-4 hours  - Increase water intake  - Exercise: Continue walking, but I love how you want to add more weight bearing exercises a few days per week  - Continue low carb at dinner time (6 oz lean protein + non-starchy veggies)  - Mexican: If having chips, put 1 handful on your plate and enjoy. Main entree would be fajita's with no tortillas (or have 1 tortilla if not having chips)  - Nuts: 1/4 cup nuts if having for a snack. If having after dinner, then 1/8 cup  -Keep snacks at work for busy days (Jiff to go PB cups, Landon's nut butter individual packets, protein shakes,   -"Josh # 7 Rule": Aisles of grocery stores; look for brands that have <7 grams added sugar and at least 7 grams protein or more (if protein bars and shakes aim for at least 20 grams protein)  - Deep fried food: Enjoy at most twice a month (or a 'fun/cheat' meal)  -Breakfast: Add up to 2 servings of carbs = 30-40 grams carbs for your breakfast and lunch (Refer to fruit page and starches & breads page of meal planning guide book to see what 1 serving of carbs = 15-20 grams is for portions  -Salads: Any non-starchy veggies + 6 oz lean protein + 2 fats each being ~2 tablespoons (dressing, cheese, nuts, avocado)  - 1 serving of whole wheat crackers -or- 1 piece of fruit with protein/fat (2 tablespoons PB, 2 oz cheese) is a great snack  - Try Simply Delish Keto pudding mix  - Try palmini hearts of palm 'pasta' (get it in a bag/pouch and not the can)  - Mix half brown rice (1/4 cup cooked portion) with riced cauliflower  - [employer] gourmet --> frozen pre made cauliflower 'pizza' crusts  -Try different carb alternatives with cottage cheese  -Take your Vitamin D3/Vitamin K every other " day instead of every day since vitamin D in your capsule is 10,000 international units. Get your Vitamin D rechecked in about 3-4 months to see if we need to adjust dosage  -Reta: low carb bread found at [facility] (no sugar added) or unsweetened jelly is great! (Polaner's All Fruit preserves is a great brand)  -Try [facility] [employer] program to see how you like it

## 2024-09-18 ENCOUNTER — OFFICE VISIT (OUTPATIENT)
Dept: FAMILY MEDICINE | Facility: CLINIC | Age: 54
End: 2024-09-18
Payer: COMMERCIAL

## 2024-09-18 VITALS
BODY MASS INDEX: 30.93 KG/M2 | TEMPERATURE: 98 F | DIASTOLIC BLOOD PRESSURE: 80 MMHG | HEIGHT: 65 IN | OXYGEN SATURATION: 98 % | RESPIRATION RATE: 19 BRPM | SYSTOLIC BLOOD PRESSURE: 130 MMHG | HEART RATE: 80 BPM | WEIGHT: 185.63 LBS

## 2024-09-18 DIAGNOSIS — J30.9 ALLERGIC RHINITIS, UNSPECIFIED SEASONALITY, UNSPECIFIED TRIGGER: ICD-10-CM

## 2024-09-18 DIAGNOSIS — F41.9 ANXIETY: Primary | ICD-10-CM

## 2024-09-18 PROCEDURE — 99999 PR PBB SHADOW E&M-EST. PATIENT-LVL IV: CPT | Mod: PBBFAC,,, | Performed by: NURSE PRACTITIONER

## 2024-09-18 PROCEDURE — 3075F SYST BP GE 130 - 139MM HG: CPT | Mod: CPTII,S$GLB,, | Performed by: NURSE PRACTITIONER

## 2024-09-18 PROCEDURE — 1159F MED LIST DOCD IN RCRD: CPT | Mod: CPTII,S$GLB,, | Performed by: NURSE PRACTITIONER

## 2024-09-18 PROCEDURE — 99214 OFFICE O/P EST MOD 30 MIN: CPT | Mod: S$GLB,,, | Performed by: NURSE PRACTITIONER

## 2024-09-18 PROCEDURE — 1160F RVW MEDS BY RX/DR IN RCRD: CPT | Mod: CPTII,S$GLB,, | Performed by: NURSE PRACTITIONER

## 2024-09-18 PROCEDURE — 3079F DIAST BP 80-89 MM HG: CPT | Mod: CPTII,S$GLB,, | Performed by: NURSE PRACTITIONER

## 2024-09-18 PROCEDURE — 3044F HG A1C LEVEL LT 7.0%: CPT | Mod: CPTII,S$GLB,, | Performed by: NURSE PRACTITIONER

## 2024-09-18 PROCEDURE — 3008F BODY MASS INDEX DOCD: CPT | Mod: CPTII,S$GLB,, | Performed by: NURSE PRACTITIONER

## 2024-09-18 RX ORDER — FLUTICASONE PROPIONATE 50 MCG
1 SPRAY, SUSPENSION (ML) NASAL
Qty: 16 G | Refills: 0 | Status: SHIPPED | OUTPATIENT
Start: 2024-09-18

## 2024-09-18 RX ORDER — ESCITALOPRAM OXALATE 20 MG/1
20 TABLET ORAL DAILY
Qty: 90 TABLET | Refills: 0 | Status: SHIPPED | OUTPATIENT
Start: 2024-09-18 | End: 2025-09-18

## 2024-09-18 RX ORDER — DESLORATADINE 5 MG/1
5 TABLET ORAL DAILY
Qty: 30 TABLET | Refills: 1 | Status: SHIPPED | OUTPATIENT
Start: 2024-09-18 | End: 2025-09-18

## 2024-09-18 RX ORDER — AZELASTINE 1 MG/ML
1 SPRAY, METERED NASAL 2 TIMES DAILY
Qty: 30 ML | Refills: 0 | Status: SHIPPED | OUTPATIENT
Start: 2024-09-18 | End: 2025-09-18

## 2024-09-18 NOTE — PROGRESS NOTES
Routine Office Visit    Patient Name: Faith Johnson    : 1970  MRN: [id number]    Chief Complaint:  Anxiety, allergies    Subjective:  Faith is a 54 y.o. female who presents today for:    Anxiety, allergies - patient who is new to me reports today for evaluation.    She does deal with some chronic allergy issues including nasal congestion and postnasal drip as well as sinus fullness.  She used to get allergy shots in the past but has not gotten these in some time.  She has been taking Xyzal for the symptoms.  Reports Flonase and Astelin sprays have helped in the past.    She does deal with anxiety which is chronic for her.  She is on Lexapro 10 mg but unsure if this is helping her.  She has been having a lot of itching in her hands and arms which she believes is due to stress.  She has had some stress at work recently and also reports some slight stress at home because her daughter will be moving soon.  She denies any serious depressive symptoms.    She is on estradiol as prescribed by her OBGYN.  She has an IUD in place.    Past Medical History  Past Medical History:   Diagnosis Date    Cervical spondylosis 2022    Hypertension     Hypothyroidism     Mitral valve prolapse        Family History  Family History   Problem Relation Name Age of Onset    No Known Problems Mother      No Known Problems Father      No Known Problems Sister      No Known Problems Brother      No Known Problems Maternal Aunt      No Known Problems Maternal Uncle      No Known Problems Paternal Aunt      No Known Problems Paternal Uncle      Cataracts Maternal Grandmother      No Known Problems Maternal Grandfather      No Known Problems Paternal Grandmother      No Known Problems Paternal Grandfather      No Known Problems Other      Breast cancer Neg Hx      Colon cancer Neg Hx      Ovarian cancer Neg Hx         Current Medications  Current Outpatient Medications on File Prior to Visit   Medication Sig Dispense Refill     estradioL (ESTRACE) 1 MG tablet Take 1 tablet (1 mg total) by mouth once daily. 90 tablet 4    levothyroxine (SYNTHROID) 100 MCG tablet Take 1 tablet by mouth once daily 90 tablet 3    meloxicam (MOBIC) 7.5 MG tablet Take 1 tablet by mouth daily 30 tablet 11    ondansetron (ZOFRAN) 4 MG tablet Take 1 tablet by mouth every 8 hours as needed for nausea/vomiting 20 tablet 1    tiZANidine (ZANAFLEX) 4 MG tablet Take 1 tablet by mouth at bedtime as needed for spasm 30 tablet 0    tiZANidine (ZANAFLEX) 4 MG tablet Take 1 tablet by mouth at bedtime as needed for spasm 30 tablet 0    clobetasol 0.05% (TEMOVATE) 0.05 % Oint APPLY TOPICALLY TO THE AFFECTED AREAS OF HANDS TWICE DAILY 60 g 1    meloxicam (MOBIC) 7.5 MG tablet Take 1 tablet by mouth daily 30 tablet 11    [DISCONTINUED] EScitalopram oxalate (LEXAPRO) 10 MG tablet Take 1 tablet by mouth daily 90 tablet 1    [DISCONTINUED] fluticasone propionate (FLONASE) 50 mcg/actuation nasal spray Use 1 spray in each nostril daily 16 g 0    [DISCONTINUED] hydrOXYzine HCL (ATARAX) 25 MG tablet Take 1 tablet (25 mg total) by mouth 3 (three) times daily as needed for Itching or Anxiety. (Patient not taking: Reported on 9/18/2024) 30 tablet 4    [DISCONTINUED] montelukast (SINGULAIR) 10 mg tablet TAKE 1 TABLET BY MOUTH EVERY EVENING 90 tablet 1     Current Facility-Administered Medications on File Prior to Visit   Medication Dose Route Frequency Provider Last Rate Last Admin    levonorgestrel 20 mcg/24 hours (5 yrs) 52 mg IUD 1 Intra Uterine Device  1 Intra Uterine Device Intrauterine  Ashanti Bee MD   1 Intra Uterine Device at 05/23/19 1527       Allergies   Review of patient's allergies indicates:   Allergen Reactions    Codeine        Review of Systems (Pertinent positives)  Review of Systems   Constitutional: Negative.  Negative for chills and fever.   HENT:  Positive for congestion. Negative for sinus pain and sore throat.    Eyes: Negative.    Respiratory:  Negative  "for cough, shortness of breath and wheezing.    Cardiovascular:  Negative for chest pain, palpitations, orthopnea and claudication.   Gastrointestinal: Negative.  Negative for abdominal pain, diarrhea, nausea and vomiting.   Genitourinary: Negative.  Negative for dysuria, frequency and urgency.   Musculoskeletal: Negative.  Negative for back pain, joint pain and neck pain.   Skin:  Positive for itching.   Neurological: Negative.  Negative for dizziness, tingling, loss of consciousness and headaches.   Endo/Heme/Allergies:  Positive for environmental allergies.   Psychiatric/Behavioral:  The patient is nervous/anxious.        /80 (BP Location: Left arm, Patient Position: Sitting, BP Method: Medium (Manual))   Pulse 80   Temp 98 °F (36.7 °C) (Oral)   Resp 19   Ht 5' 5" (1.651 m)   Wt 84.2 kg (185 lb 10 oz)   SpO2 98%   BMI 30.89 kg/m²     Physical Exam  Vitals reviewed.   Constitutional:       General: She is not in acute distress.     Appearance: Normal appearance. She is not ill-appearing, toxic-appearing or diaphoretic.   HENT:      Head: Normocephalic and atraumatic.      Right Ear: Tympanic membrane, ear canal and external ear normal.      Left Ear: Tympanic membrane, ear canal and external ear normal.      Nose: Nose normal. No congestion or rhinorrhea.      Mouth/Throat:      Mouth: Mucous membranes are moist.      Pharynx: Oropharynx is clear. No oropharyngeal exudate or posterior oropharyngeal erythema.   Eyes:      General:         Right eye: No discharge.         Left eye: No discharge.      Extraocular Movements: Extraocular movements intact.      Conjunctiva/sclera: Conjunctivae normal.   Cardiovascular:      Rate and Rhythm: Normal rate and regular rhythm.      Pulses: Normal pulses.      Heart sounds: Normal heart sounds.   Pulmonary:      Effort: Pulmonary effort is normal. No respiratory distress.      Breath sounds: Normal breath sounds. No wheezing.   Abdominal:      General: Bowel " sounds are normal. There is no distension.      Palpations: Abdomen is soft.      Tenderness: There is no abdominal tenderness.   Musculoskeletal:         General: No swelling, tenderness or deformity. Normal range of motion.   Skin:     General: Skin is warm and dry.      Capillary Refill: Capillary refill takes less than 2 seconds.   Neurological:      General: No focal deficit present.      Mental Status: She is alert and oriented to person, place, and time.   Psychiatric:         Mood and Affect: Mood normal.         Behavior: Behavior normal.          Assessment/Plan:  Faith Johnson is a 54 y.o. female who presents today for :    Faith was seen today for headache and weight gain.    Diagnoses and all orders for this visit:    Anxiety  -     EScitalopram oxalate (LEXAPRO) 20 MG tablet; Take 1 tablet (20 mg total) by mouth once daily.    Trial increased dose of Lexapro to target anxiety.  Potential side effects discussed.  If no improvement over the next 4-6 weeks, can consider referral to Psychiatry.    Allergic rhinitis, unspecified seasonality, unspecified trigger  -     fluticasone propionate (FLONASE) 50 mcg/actuation nasal spray; Use 1 spray in each nostril daily  -     azelastine (ASTELIN) 137 mcg (0.1 %) nasal spray; 1 spray (137 mcg total) by Nasal route 2 (two) times daily.  -     desloratadine (CLARINEX) 5 mg tablet; Take 1 tablet (5 mg total) by mouth once daily.    Patient has had relief with nasal sprays in the past.  Flonase and Astelin filled for patient.  Trial Clarinex; patient can use Zyrtec or Allegra over-the-counter if Clarinex too expensive.    All questions answered.  Follow up with me as needed.        This office note has been dictated.  This dictation has been generated using M-Modal Fluency Direct dictation; some phonetic errors may occur.

## 2024-10-01 ENCOUNTER — OFFICE VISIT (OUTPATIENT)
Dept: OPTOMETRY | Facility: CLINIC | Age: 54
End: 2024-10-01
Payer: COMMERCIAL

## 2024-10-01 DIAGNOSIS — H52.7 REFRACTIVE ERROR: ICD-10-CM

## 2024-10-01 DIAGNOSIS — Z01.00 EXAMINATION OF EYES AND VISION: Primary | ICD-10-CM

## 2024-10-01 PROCEDURE — 92015 DETERMINE REFRACTIVE STATE: CPT | Mod: S$GLB,,, | Performed by: OPTOMETRIST

## 2024-10-01 PROCEDURE — 3044F HG A1C LEVEL LT 7.0%: CPT | Mod: CPTII,S$GLB,, | Performed by: OPTOMETRIST

## 2024-10-01 PROCEDURE — 99999 PR PBB SHADOW E&M-EST. PATIENT-LVL III: CPT | Mod: PBBFAC,,, | Performed by: OPTOMETRIST

## 2024-10-01 PROCEDURE — 1160F RVW MEDS BY RX/DR IN RCRD: CPT | Mod: CPTII,S$GLB,, | Performed by: OPTOMETRIST

## 2024-10-01 PROCEDURE — 1159F MED LIST DOCD IN RCRD: CPT | Mod: CPTII,S$GLB,, | Performed by: OPTOMETRIST

## 2024-10-01 PROCEDURE — 92014 COMPRE OPH EXAM EST PT 1/>: CPT | Mod: S$GLB,,, | Performed by: OPTOMETRIST

## 2024-10-01 NOTE — PROGRESS NOTES
Subjective:       Patient ID: Faith Johnson is a 54 y.o. female      Chief Complaint   Patient presents with    Concerns About Ocular Health     History of Present Illness  Dls: 10/16/23 Dr. Lay     54 y/o female  presents today for ocular health check.  Pt states no changes in vision. Pt wears single vision glasses.     No tearing  No itching  No burning  No pain  No ha's  No floaters  No flashes    Eye meds  None    Pohx:  None    Fohx:  Cat - grandmother           Assessment/Plan:     1. Examination of eyes and vision (Primary)  [facility]    2. Refractive error  Educated patient on refractive error and discussed lens options. Dispensed updated spectacle Rx. Educated about adaptation period to new specs.    Eyeglass Final Rx       Eyeglass Final Rx         Sphere Cylinder Add    Right -0.50 Sphere +1.75    Left -0.50 Sphere +1.75      Expiration Date: 10/1/2025                      Follow up in about 1 year (around 10/1/2025).

## 2024-10-02 ENCOUNTER — CLINICAL SUPPORT (OUTPATIENT)
Dept: NUTRITION | Facility: CLINIC | Age: 54
End: 2024-10-02
Payer: COMMERCIAL

## 2024-10-02 VITALS — WEIGHT: 182 LBS | BODY MASS INDEX: 30.32 KG/M2 | HEIGHT: 65 IN

## 2024-10-02 DIAGNOSIS — I10 BENIGN HYPERTENSION: ICD-10-CM

## 2024-10-02 DIAGNOSIS — Z71.3 DIETARY COUNSELING AND SURVEILLANCE: Primary | ICD-10-CM

## 2024-10-02 DIAGNOSIS — E66.9 OBESITY (BMI 30-39.9): ICD-10-CM

## 2024-10-02 PROCEDURE — 97803 MED NUTRITION INDIV SUBSEQ: CPT | Mod: 95,,, | Performed by: NUTRITIONIST

## 2024-10-02 NOTE — PROGRESS NOTES
"Nutrition Assessment    Visit Type: employee wellness consult  Session Time:  37 minutes  Reason for MNT visit: Pt in for education and nutrition counseling regarding Obesity, goal of weight loss  The patient location is: her home  The chief complaint leading to consultation is: goal of body fat loss    Visit type: audiovisual    Face to Face time with patient: 37 minutes  45 minutes of total time spent on the encounter, which includes face to face time and non-face to face time preparing to see the patient (eg, review of tests), Obtaining and/or reviewing separately obtained history, Documenting clinical information in the electronic or other health record, Independently interpreting results (not separately reported) and communicating results to the patient/family/caregiver, or Care coordination (not separately reported).       Each patient to whom he or she provides medical services by telemedicine is:  (1) informed of the relationship between the physician and patient and the respective role of any other health care provider with respect to management of the patient; and (2) notified that he or she may decline to receive medical services by telemedicine and may withdraw from such care at any time.      Age: 54 y.o.  Wt:   Wt Readings from Last 1 Encounters:   10/02/24 82.6 kg (182 lb)     Ht:   Ht Readings from Last 1 Encounters:   10/02/24 5' 5" (1.651 m)     BMI:   BMI Readings from Last 1 Encounters:   10/02/24 30.29 kg/m²       Client states:    10.2.2024 virtual follow-up: She got in touch with [facility], and has a consult to see if she is approved in November. Reports walking more and walks a minimum of every other day if not every day for 35-40 minutes at a decent pace. Reports trying to eat better as a family. Her weight is 182.8 pounds, which is down from where she started off at 185 pounds. Her clothes are fitting looser. Since the family is on the plan it's been easier. Stated she " tried some new brands and tried making a high protein dip using cottage cheese    2024: Last seen in 2023. She and her  want to eat healthy and lose weight. She wants to watch her carb and sugar intake and discuss rewards on the weekend. Dealing with some menopause/hormone issues. Reports losing 55 pounds from 7750-1030 and is slowly gaining the weight back since then. Thinking about joining [employer] program at [facility]. I highly encourage her to try it out and see how she likes it    Medical History  Problem List             Resolved    Atrophy of thyroid         Benign hypertension         Flank pain         Refractive error         Cervical spondylosis         Lumbar spondylosis         DDD (degenerative disc disease), cervical         DDD (degenerative disc disease), lumbar         Encounter for long-term (current) use of other medications            Past Medical History:   Diagnosis Date    Cervical spondylosis 2022    Hypertension     Hypothyroidism     Mitral valve prolapse        Past Surgical History:   Procedure Laterality Date     SECTION      CHOLECYSTECTOMY      EPIDURAL STEROID INJECTION Bilateral 10/26/2022    Procedure: Bilateral L5 Transforaminal Epidural Steroid Injections (ref: Floyd);  Surgeon: Juan Conrad Jr., MD;  Location: [facility];  Service: Pain Management;  Laterality: Bilateral;  @1245  No ATC or DM  Needs Consent    plantar fasciitis            Medications    Prior to Admission medications    Medication Sig Start Date End Date Taking? Authorizing Provider   EScitalopram oxalate (LEXAPRO) 10 MG tablet Take 1 tablet by mouth daily 3/25/24      estradioL (ESTRACE) 1 MG tablet Take 1 tablet (1 mg total) by mouth once daily. 24   Ras Oviedo MD   fluticasone propionate (FLONASE) 50 mcg/actuation nasal spray Use 1 spray in each nostril daily 24      hydrOXYzine HCL (ATARAX) 25 MG tablet Take 1 tablet (25 mg total) by mouth 3  (three) times daily as needed for Itching or Anxiety. 8/8/24   Ras Oviedo MD   levothyroxine (SYNTHROID) 100 MCG tablet Take 1 tablet by mouth once daily 3/19/24      meloxicam (MOBIC) 7.5 MG tablet Take 1 tablet by mouth daily 7/11/23      montelukast (SINGULAIR) 10 mg tablet TAKE 1 TABLET BY MOUTH EVERY EVENING 1/22/21   Janelle Medeiros MD   ondansetron (ZOFRAN) 4 MG tablet Take 1 tablet by mouth every 8 hours as needed for nausea/vomiting 9/22/23   Janelle Medeiros MD   tiZANidine (ZANAFLEX) 4 MG tablet Take 1 tablet by mouth at bedtime as needed for spasm 10/24/22      tiZANidine (ZANAFLEX) 4 MG tablet Take 1 tablet by mouth at bedtime as needed for spasm 4/20/23           Food Allergies or Intolerances:  NKFA     Social History    Marital status:      Social History     Tobacco Use    Smoking status: Never    Smokeless tobacco: Never   Substance Use Topics    Alcohol use: Yes     Comment: rare     Current Alcohol use: every other month (1/2 glass of red wine or a low carb beer)    Lab Reports:  Reviewed and noted     Lab Results   Component Value Date    [id number] 45 05/28/2024    TSH 0.601 05/28/2024    TSH 0.601 05/28/2024    FREET4 1.08 05/28/2024    FREET4 1.08 05/28/2024    CRP 1.2 05/09/2022    SEDRATE 4 05/09/2022    AST 15 08/15/2024    ALT 13 08/15/2024    HDL 63 05/28/2024    HDL 63 05/28/2024    LDLCALC 118.8 05/28/2024    LDLCALC 118.8 05/28/2024    TRIG 106 05/28/2024    TRIG 106 05/28/2024    HGBA1C 5.1 05/28/2024         BP Readings from Last 1 Encounters:   09/18/24 130/80      24-hour Recall:      Food choices (After Midnight)  Patient eating midnight to 4am: (P) Never          Food choices (Early Morning)  Patient eats 4am to 8am: (P) Never                                 Food choices (Morning)  Patient eats 8am to noon: (P) Once or twice a week                 Fast food meals (8am - midnight): (P) none        Food choices (Afternoon)  Patient eats noon to 4pm: (P)  Once or twice a week   Home cooked meals (Noon - 4pm): (P) Cheese, nuts, eggs, fruit, leftover grilled meats       Snacks (Noon to 4pm): (P) Protein Shakes        Food choices (Evening)         Patient eats 4pm to 8pm: (P) Every day          Home cooked meals (4pm - 8pm): (P) Grilled meats and veggies      Fast food meals (4pm - 8pm): (P) none           Snacks (4pm - 8pm): (P) Cheese, nuts, eggs, fruit         Food choices (Late evening)  R [facility] PEQ NUTRITION HOW OFTEN EATING LATE EVENING: (P) Once or twice a month   Home cooked meals (8pm - midnight): (P) Cheese, nuts, eggs, fruit, leftover grilled meats   Fast food meals (8pm - midnight): (P) none     Snacks (8pm - midnight: (P) Protein Shakes, Cheese, nuts, eggs, fruit                       Beverages:  How much water consumed (per day?): (P) 6             Cups of milk consumed (per day?): (P) 0                       Cups of  juice consumed (per day?): (P) 0       Number of supplement shakes (per day?): (P) 0                       Number of cups of coffee (per day?): (P) 0                                Cups of soda consumed (per day?): (P) 0       Other non-alcoholic beverages consumed (per day?): (P) 2   Types of other beverages: (P) Crystal light          LIFESTYLE FACTORS    Dinning out: 1-2 x per week    Meal preparation/shopping: self     Sleep: fair, but getting better since started a small dose of hormones     Stress Level: low    Support System:  spouse    Exercise Regimen: walks for 35 minutes with her  everyday. She wants to add weight bearing exercises a few days a week      Diagnosis    Excessive energy intake related to not always eating every 3-4 hours as evidenced by 24 hour recall.      Intervention    Estimated Energy Requirements:   Calories: 1,800 kcal  Protein: 135 grams  Carbohydrates: 135 grams  Total Fat: 80 grams   Baseline for fluids: 90 oz + sweat loss    Recommendations & Goals:  Patient goals and recommendations are tailored to  "the specific patient's needs, readiness to change, lifestyle, culture, skills, resources, & abilities. Strategies to help achieve these nutrition-related goals were discussed which can include but are not limited to SMART goal setting & mindful eating.     Aim for a minimum of 7 hours sleep   Exercise 60 minutes most days  Eat breakfast within 1-2 hours of waking up  Try not to skip any meals or snacks, not going more than 3-4 hours without eating   At each meal and snack, try to include a source of fiber + lean protein + healthy fat     Written Materials Provided  These resources are intended to assist the patient in making it easier to choose recommended options when eating out & to identify better-for-you brands at the grocery store:    Meal Planning Guide with recommendations discussed along with portion sizes and a customized meal plan   Fueling Well On-the-Go Food Guide  Eat Fit Shopping Guide  Lifestyle Nutrition Meal Guide  RD contact information    Goals:   - Eat every 3-4 hours  - Increase water intake  - Exercise: Continue walking, but I love how you want to add more weight bearing exercises a few days per week  - Continue low carb at dinner time (6 oz lean protein + non-starchy veggies)  - Mexican: If having chips, put 1 handful on your plate and enjoy. Main entree would be fajita's with no tortillas (or have 1 tortilla if not having chips)  - Nuts: 1/4 cup nuts if having for a snack. If having after dinner, then 1/8 cup  -Keep snacks at work for busy days (Jiff to go PB cups, Landon's nut butter individual packets, protein shakes,   -"Josh # 7 Rule": Aisles of grocery stores; look for brands that have <7 grams added sugar and at least 7 grams protein or more (if protein bars and shakes aim for at least 20 grams protein)  - Deep fried food: Enjoy at most twice a month (or a 'fun/cheat' meal)  -Breakfast: Add up to 2 servings of carbs = 30-40 grams carbs for your breakfast and lunch (Refer to fruit page " and starches & breads page of meal planning guide book to see what 1 serving of carbs = 15-20 grams is for portions  -Salads: Any non-starchy veggies + 6 oz lean protein + 2 fats each being ~2 tablespoons (dressing, cheese, nuts, avocado)  - 1 serving of whole wheat crackers -or- 1 piece of fruit with protein/fat (2 tablespoons PB, 2 oz cheese) is a great snack  - Try Simply Delish Keto pudding mix  - Try palmini hearts of palm 'pasta' (get it in a bag/pouch and not the can)  - Mix half brown rice (1/4 cup cooked portion) with riced cauliflower  - [employer] gourmet --> frozen pre made cauliflower 'pizza' crusts  -Try different carb alternatives with cottage cheese  -Take your Vitamin D3/Vitamin K every other day instead of every day since vitamin D in your capsule is 10,000 international units. Get your Vitamin D rechecked in about 3-4 months to see if we need to adjust dosage  -Reta: low carb bread found at [facility] (no sugar added) or unsweetened jelly is great! (Polaner's All Fruit preserves is a great brand)      Monitoring/Evaluation    Monitor the following:  Weight  Sleep  Stress Management  Movement  Nutrient intake in reference to meal plan    Communicated with healthcare provider and documented plan for referral to appropriate agency/healthcare provider as needed    Supervising Physician: Joe Dueñas MD    Patient motivation, anticipated barriers, expected compliance: Patient is motivated and has verbalized understanding and intent to comply.     Comprehension: good     Follow-up: 6-8 weeks or prn

## 2024-10-02 NOTE — PATIENT INSTRUCTIONS
-Turkey Italian Sausage--more flavorful than just turkey sausage to use in your red beans  -Consistency is key  -Request sliced bell peppers at Mexican restaurants in place of chips. (You can still have 1 handful of chips, but after that choose sliced bell peppers)  -RD20 is our discount code for the Eat Fit Cookbook  -Recipes:  Real Food Dietitians  Budget Bytes  Eating Bird Food  Fit Foodie Finds  Wholesome Yum  Oh Snap Macros  35 Macro Friendly Meal Prep Recipes  9 Meal Prep Success Tips  Freezer Meal Prep Guide  Freezer Meal Ideas

## 2024-10-17 DIAGNOSIS — J30.9 ALLERGIC RHINITIS, UNSPECIFIED SEASONALITY, UNSPECIFIED TRIGGER: ICD-10-CM

## 2024-10-17 DIAGNOSIS — F41.9 ANXIETY: ICD-10-CM

## 2024-10-17 RX ORDER — DESLORATADINE 5 MG/1
5 TABLET ORAL DAILY
Qty: 90 TABLET | Refills: 1 | Status: SHIPPED | OUTPATIENT
Start: 2024-10-17 | End: 2025-10-17

## 2024-10-17 RX ORDER — FLUTICASONE PROPIONATE 50 MCG
1 SPRAY, SUSPENSION (ML) NASAL
Qty: 16 G | Refills: 2 | Status: SHIPPED | OUTPATIENT
Start: 2024-10-17

## 2024-10-17 RX ORDER — ESCITALOPRAM OXALATE 20 MG/1
20 TABLET ORAL DAILY
Qty: 90 TABLET | Refills: 1 | Status: SHIPPED | OUTPATIENT
Start: 2024-10-17 | End: 2025-10-17

## 2024-10-17 RX ORDER — AZELASTINE 1 MG/ML
1 SPRAY, METERED NASAL 2 TIMES DAILY
Qty: 30 ML | Refills: 2 | Status: SHIPPED | OUTPATIENT
Start: 2024-10-17 | End: 2025-10-17

## 2024-10-17 NOTE — PROGRESS NOTES
Patient ID: Faith Johnson is a 54 y.o. White female    Subjective  Chief Complaint: patient presents for medical weight loss management.    Contraindications to GLP-1 receptor agonist therapy:   Denies personal or family history of MTC and personal history of MEN2     Pt reports mother had thyroid cancer, pt verified with mother diagnosed with PTC    Co-morbidities: HTN    History of weight loss therapy:  Pt denies previous weight management medication use.     Weight loss history:  Starting weight:    10/17/2024   Recent Readings    Weight (lbs) 184.6 lb    BMI 29.79 BMI      Objective  Lab Results   Component Value Date     08/15/2024     05/28/2024     05/28/2024     Lab Results   Component Value Date    K 4.2 08/15/2024    K 4.2 05/28/2024    K 4.2 05/28/2024     Lab Results   Component Value Date     08/15/2024     05/28/2024     05/28/2024     Lab Results   Component Value Date    CO2 23 08/15/2024    CO2 24 05/28/2024    CO2 24 05/28/2024     Lab Results   Component Value Date    BUN 18 08/15/2024    BUN 16 05/28/2024    BUN 16 05/28/2024     Lab Results   Component Value Date     08/15/2024    GLU 88 05/28/2024    GLU 88 05/28/2024     Lab Results   Component Value Date    CALCIUM 9.3 08/15/2024    CALCIUM 9.3 05/28/2024    CALCIUM 9.3 05/28/2024     Lab Results   Component Value Date    PROT 6.5 08/15/2024    PROT 6.9 05/28/2024    PROT 6.9 05/28/2024     Lab Results   Component Value Date    ALBUMIN 3.7 08/15/2024    ALBUMIN 3.8 05/28/2024    ALBUMIN 3.8 05/28/2024     Lab Results   Component Value Date    BILITOT 0.5 08/15/2024    BILITOT 0.6 05/28/2024    BILITOT 0.6 05/28/2024     Lab Results   Component Value Date    AST 15 08/15/2024    AST 19 05/28/2024    AST 19 05/28/2024     Lab Results   Component Value Date    ALT 13 08/15/2024    ALT 24 05/28/2024    ALT 24 05/28/2024     Lab Results   Component Value Date    ANIONGAP 9 08/15/2024    ANIONGAP 6 (L)  05/28/2024    ANIONGAP 6 (L) 05/28/2024     Lab Results   Component Value Date    CREATININE 0.8 08/15/2024    CREATININE 0.9 05/28/2024    CREATININE 0.9 05/28/2024     Lab Results   Component Value Date    EGFRNORACEVR >60 08/15/2024    EGFRNORACEVR >60 05/28/2024    EGFRNORACEVR >60 05/28/2024     Assessment/Plan  -Pt qualifies for GLP-1 RA therapy based on BMI greater than or equal to 27 kg/m2 plus presence of co-morbidities  - Initiate Zepbound 2.5 mg SQ weekly x 4 weeks  - Then increase to Zepbound 5 mg SQ weekly  - RTC in 3 months for follow-up evaluation     Patient consented to pharmacist management via collaborative practice.

## 2024-10-18 ENCOUNTER — OFFICE VISIT (OUTPATIENT)
Dept: INTERNAL MEDICINE | Facility: CLINIC | Age: 54
End: 2024-10-18
Payer: COMMERCIAL

## 2024-10-18 ENCOUNTER — PATIENT MESSAGE (OUTPATIENT)
Dept: INTERNAL MEDICINE | Facility: CLINIC | Age: 54
End: 2024-10-18

## 2024-10-18 DIAGNOSIS — E66.9 OBESITY, UNSPECIFIED CLASS, UNSPECIFIED OBESITY TYPE, UNSPECIFIED WHETHER SERIOUS COMORBIDITY PRESENT: Primary | ICD-10-CM

## 2024-10-18 RX ORDER — TIRZEPATIDE 2.5 MG/.5ML
2.5 INJECTION, SOLUTION SUBCUTANEOUS
Qty: 2 ML | Refills: 0 | Status: SHIPPED | OUTPATIENT
Start: 2024-10-18

## 2024-10-18 RX ORDER — TIRZEPATIDE 5 MG/.5ML
5 INJECTION, SOLUTION SUBCUTANEOUS
Qty: 2 ML | Refills: 1 | Status: SHIPPED | OUTPATIENT
Start: 2024-10-18

## 2024-10-18 NOTE — PATIENT INSTRUCTIONS
Zepbound Patient Education  Savings Card  Follow this link to sign up for your Zepbound savings card. You MAY need this information when the specialty pharmacy contacts you to help pay for your prescription.  Zepbound Savings Card    Dosing Schedule      Choosing an Injection Site and Using your Pen       - Pens are stored in the refrigerator and can be removed 20-30 minutes before the injection to allow the medication to come to room temperature to avoid irritation, burning, or bruising with injection.     What to Expect      Rare, but Serious Side Effects  - Zepbound may cause pancreatitis or gallstones. If you experience severe abdominal pain that may radiate to the back and may or may not be accompanied by vomiting, you are encouraged to seek medical attention to assess your symptoms.     Reproduction, Pregnancy, and Lactation Considerations  - Zepbound is not recommended for use in patients who are currently pregnant or breastfeeding.   - If you plan to become pregnant, the use of this medication is not recommended at the time of conception. It is recommended that this medication should be discontinued at least 2 months prior to conception.     Patient's using Oral Contraceptives  - Use of Zepbound may decrease the effectiveness of your oral hormonal contraceptives. You may consider switching to an alternative therapy.  - Otherwise, while using Zepbound alongside your oral hormonal contraceptive, you should add a barrier method of contraception for 4 weeks after initiation and for 4 weeks after each dose titration of Zepbound.     Missed or Changing Your Dosing Schedule  - If you miss a dose of Zepbound, take it as soon as possible, within 4 days of your scheduled dose. If more than 4 days have passed, skip the missed dose and take your next dose on your regularly scheduled day.  - If you would like to change the day of the week you take your Zepbound dose, make sure there are at least 3 days between doses.

## 2024-10-24 ENCOUNTER — PATIENT MESSAGE (OUTPATIENT)
Dept: FAMILY MEDICINE | Facility: CLINIC | Age: 54
End: 2024-10-24
Payer: COMMERCIAL

## 2024-11-11 ENCOUNTER — PATIENT MESSAGE (OUTPATIENT)
Dept: OBSTETRICS AND GYNECOLOGY | Facility: CLINIC | Age: 54
End: 2024-11-11
Payer: COMMERCIAL

## 2024-11-11 RX ORDER — ONDANSETRON 4 MG/1
4 TABLET, ORALLY DISINTEGRATING ORAL EVERY 4 HOURS PRN
Qty: 30 TABLET | Refills: 3 | Status: SHIPPED | OUTPATIENT
Start: 2024-11-11

## 2024-11-14 ENCOUNTER — PATIENT MESSAGE (OUTPATIENT)
Dept: ADMINISTRATIVE | Facility: OTHER | Age: 54
End: 2024-11-14
Payer: COMMERCIAL

## 2024-11-19 ENCOUNTER — PATIENT MESSAGE (OUTPATIENT)
Dept: INTERNAL MEDICINE | Facility: CLINIC | Age: 54
End: 2024-11-19
Payer: COMMERCIAL

## 2024-12-04 ENCOUNTER — CLINICAL SUPPORT (OUTPATIENT)
Dept: NUTRITION | Facility: CLINIC | Age: 54
End: 2024-12-04
Payer: COMMERCIAL

## 2024-12-04 VITALS — BODY MASS INDEX: 28.99 KG/M2 | HEIGHT: 65 IN | WEIGHT: 174 LBS

## 2024-12-04 DIAGNOSIS — Z71.3 DIETARY COUNSELING AND SURVEILLANCE: ICD-10-CM

## 2024-12-04 DIAGNOSIS — E66.9 OBESITY (BMI 30-39.9): Primary | ICD-10-CM

## 2024-12-04 DIAGNOSIS — I10 BENIGN HYPERTENSION: ICD-10-CM

## 2024-12-04 NOTE — PATIENT INSTRUCTIONS
-Eat or drink a protein shake every 3-4 hours  -Cottage cheese eggs  -Increase strength training exercises  -Dietary Fiber comes from fruits, veggies, whole grains, beans & nuts   -Lentil pasta is an excellent source of fiber   -Berries are an excellent source of fiber   -Kiwi can help relieve constipation   -Recommend getting at least 1 personal training session with a  to see what to do

## 2024-12-04 NOTE — PROGRESS NOTES
"Nutrition Assessment    Visit Type: employee wellness consult follow-up  Session Time:  33 minutes   Reason for MNT visit: Pt in for education and nutrition counseling regarding Obesity, goal of weight loss  The patient location is: work  The chief complaint leading to consultation is: goal of body fat loss    Visit type: audiovisual    Face to Face time with patient: 33 minutes   45 minutes of total time spent on the encounter, which includes face to face time and non-face to face time preparing to see the patient (eg, review of tests), Obtaining and/or reviewing separately obtained history, Documenting clinical information in the electronic or other health record, Independently interpreting results (not separately reported) and communicating results to the patient/family/caregiver, or Care coordination (not separately reported).       Each patient to whom he or she provides medical services by telemedicine is:  (1) informed of the relationship between the physician and patient and the respective role of any other health care provider with respect to management of the patient; and (2) notified that he or she may decline to receive medical services by telemedicine and may withdraw from such care at any time.      Age: 54 y.o.  Wt:   Wt Readings from Last 1 Encounters:   10/02/24 82.6 kg (182 lb)     Ht:   Ht Readings from Last 1 Encounters:   10/02/24 5' 5" (1.651 m)     BMI:   BMI Readings from Last 1 Encounters:   10/02/24 30.29 kg/m²       Client states:      12.4.2024 virtual follow-up: On week 7 on Zepbound. She is down 9 pounds so far and is doing great. She did well over Thanksgiving. She is walking every day (minimum 5 days per week) for 45 minutes with her . She is working on increasing her protein intake     10.2.2024 virtual follow-up: She got in touch with [facility], and has a consult to see if she is approved in November. Reports walking more and walks a minimum of every other " day if not every day for 35-40 minutes at a decent pace. Reports trying to eat better as a family. Her weight is 182.8 pounds, which is down from where she started off at 185 pounds. Her clothes are fitting looser. Since the family is on the plan it's been easier. Stated she tried some new brands and tried making a high protein dip using cottage cheese    8: Last seen in 2023. She and her  want to eat healthy and lose weight. She wants to watch her carb and sugar intake and discuss rewards on the weekend. Dealing with some menopause/hormone issues. Reports losing 55 pounds from 3030-0902 and is slowly gaining the weight back since then. Thinking about joining [facility] program [facility] I highly encourage her to try it out and see how she likes it    Medical History  Problem List             Resolved    Atrophy of thyroid         Benign hypertension         Flank pain         Refractive error         Cervical spondylosis         Lumbar spondylosis         DDD (degenerative disc disease), cervical         DDD (degenerative disc disease), lumbar         Encounter for long-term (current) use of other medications            Past Medical History:   Diagnosis Date    Cervical spondylosis 2022    Hypertension     Hypothyroidism     Mitral valve prolapse        Past Surgical History:   Procedure Laterality Date     SECTION      CHOLECYSTECTOMY      EPIDURAL STEROID INJECTION Bilateral 10/26/2022    Procedure: Bilateral L5 Transforaminal Epidural Steroid Injections (ref: Floyd);  Surgeon: Juan Conrad Jr., MD;  Location: [facility];  Service: Pain Management;  Laterality: Bilateral;  @1240  No ATC or DM  Needs Consent    plantar fasciitis            Medications    Prior to Admission medications    Medication Sig Start Date End Date Taking? Authorizing Provider   EScitalopram oxalate (LEXAPRO) 10 MG tablet Take 1 tablet by mouth daily 3/25/24      estradioL (ESTRACE) 1 MG  tablet Take 1 tablet (1 mg total) by mouth once daily. 8/8/24   Ras Oviedo MD   fluticasone propionate (FLONASE) 50 mcg/actuation nasal spray Use 1 spray in each nostril daily 7/21/24      hydrOXYzine HCL (ATARAX) 25 MG tablet Take 1 tablet (25 mg total) by mouth 3 (three) times daily as needed for Itching or Anxiety. 8/8/24   Ras Oviedo MD   levothyroxine (SYNTHROID) 100 MCG tablet Take 1 tablet by mouth once daily 3/19/24      meloxicam (MOBIC) 7.5 MG tablet Take 1 tablet by mouth daily 7/11/23      montelukast (SINGULAIR) 10 mg tablet TAKE 1 TABLET BY MOUTH EVERY EVENING 1/22/21   Janelle Medeiros MD   ondansetron (ZOFRAN) 4 MG tablet Take 1 tablet by mouth every 8 hours as needed for nausea/vomiting 9/22/23   Janelle Medeiros MD   tiZANidine (ZANAFLEX) 4 MG tablet Take 1 tablet by mouth at bedtime as needed for spasm 10/24/22      tiZANidine (ZANAFLEX) 4 MG tablet Take 1 tablet by mouth at bedtime as needed for spasm 4/20/23           Food Allergies or Intolerances:  NKFA     Social History    Marital status:      Social History     Tobacco Use    Smoking status: Never    Smokeless tobacco: Never   Substance Use Topics    Alcohol use: Yes     Comment: rare     Current Alcohol use: every other month (1/2 glass of red wine or a low carb beer)    Lab Reports:  Reviewed and noted     Lab Results   Component Value Date    [id number] 45 05/28/2024    TSH 0.601 05/28/2024    TSH 0.601 05/28/2024    FREET4 1.08 05/28/2024    FREET4 1.08 05/28/2024    CRP 1.2 05/09/2022    SEDRATE 4 05/09/2022    AST 15 08/15/2024    ALT 13 08/15/2024    HDL 63 05/28/2024    HDL 63 05/28/2024    LDLCALC 118.8 05/28/2024    LDLCALC 118.8 05/28/2024    TRIG 106 05/28/2024    TRIG 106 05/28/2024    HGBA1C 5.1 05/28/2024         BP Readings from Last 1 Encounters:   09/18/24 130/80      24-hour Recall:      Food choices (After Midnight)  Patient eating midnight to 4am: (P) Never          Food choices (Early  Morning)  Patient eats 4am to 8am: (P) Never                                 Food choices (Morning)  Patient eats 8am to noon: (P) Once or twice a week                 Fast food meals (8am - midnight): (P) none        Food choices (Afternoon)  Patient eats noon to 4pm: (P) Once or twice a week   Home cooked meals (Noon - 4pm): (P) Cheese, nuts, eggs, fruit, leftover grilled meats       Snacks (Noon to 4pm): (P) Protein Shakes        Food choices (Evening)         Patient eats 4pm to 8pm: (P) Every day          Home cooked meals (4pm - 8pm): (P) Grilled meats and veggies      Fast food meals (4pm - 8pm): (P) none           Snacks (4pm - 8pm): (P) Cheese, nuts, eggs, fruit         Food choices (Late evening)  R [facility] PEQ NUTRITION HOW OFTEN EATING LATE EVENING: (P) Once or twice a month   Home cooked meals (8pm - midnight): (P) Cheese, nuts, eggs, fruit, leftover grilled meats   Fast food meals (8pm - midnight): (P) none     Snacks (8pm - midnight: (P) Protein Shakes, Cheese, nuts, eggs, fruit                       Beverages:  How much water consumed (per day?): (P) 6             Cups of milk consumed (per day?): (P) 0                       Cups of  juice consumed (per day?): (P) 0       Number of supplement shakes (per day?): (P) 0                       Number of cups of coffee (per day?): (P) 0                                Cups of soda consumed (per day?): (P) 0       Other non-alcoholic beverages consumed (per day?): (P) 2   Types of other beverages: (P) Crystal light          LIFESTYLE FACTORS    Dinning out: 1-2 x per week    Meal preparation/shopping: self     Sleep: fair, but getting better since started a small dose of hormones     Stress Level: low    Support System:  spouse    Exercise Regimen: walks for 35 minutes with her  everyday. She wants to add weight bearing exercises a few days a week      Diagnosis    Excessive energy intake related to not always eating every 3-4 hours as evidenced by 24  "hour recall.      Intervention    Estimated Energy Requirements:   Calories: 1,800 kcal  Protein: 135 grams  Carbohydrates: 135 grams  Total Fat: 80 grams   Baseline for fluids: 90 oz + sweat loss    Recommendations & Goals:  Patient goals and recommendations are tailored to the specific patient's needs, readiness to change, lifestyle, culture, skills, resources, & abilities. Strategies to help achieve these nutrition-related goals were discussed which can include but are not limited to SMART goal setting & mindful eating.     Aim for a minimum of 7 hours sleep   Exercise 60 minutes most days  Eat breakfast within 1-2 hours of waking up  Try not to skip any meals or snacks, not going more than 3-4 hours without eating   At each meal and snack, try to include a source of fiber + lean protein + healthy fat     Written Materials Provided  These resources are intended to assist the patient in making it easier to choose recommended options when eating out & to identify better-for-you brands at the grocery store:    Meal Planning Guide with recommendations discussed along with portion sizes and a customized meal plan   Fueling Well On-the-Go Food Guide  Eat Fit Shopping Guide  Lifestyle Nutrition Meal Guide  RD contact information    Goals:   - Eat every 3-4 hours  - Increase water intake  - Exercise: Continue walking, but I love how you want to add more weight bearing exercises a few days per week  - Continue low carb at dinner time (6 oz lean protein + non-starchy veggies)  - Mexican: If having chips, put 1 handful on your plate and enjoy. Main entree would be fajita's with no tortillas (or have 1 tortilla if not having chips)  - Nuts: 1/4 cup nuts if having for a snack. If having after dinner, then 1/8 cup  -Keep snacks at work for busy days (Jiff to go PB cups, Landon's nut butter individual packets, protein shakes,   -"Josh # 7 Rule": Aisles of grocery stores; look for brands that have <7 grams added sugar and at " least 7 grams protein or more (if protein bars and shakes aim for at least 20 grams protein)  - Deep fried food: Enjoy at most twice a month (or a 'fun/cheat' meal)  -Breakfast: Add up to 2 servings of carbs = 30-40 grams carbs for your breakfast and lunch (Refer to fruit page and starches & breads page of meal planning guide book to see what 1 serving of carbs = 15-20 grams is for portions  -Salads: Any non-starchy veggies + 6 oz lean protein + 2 fats each being ~2 tablespoons (dressing, cheese, nuts, avocado)  - 1 serving of whole wheat crackers -or- 1 piece of fruit with protein/fat (2 tablespoons PB, 2 oz cheese) is a great snack  - Try Simply Delish Keto pudding mix  - Try palmini hearts of palm 'pasta' (get it in a bag/pouch and not the can)  - Mix half brown rice (1/4 cup cooked portion) with riced cauliflower  - [employer] gourmet --> frozen pre made cauliflower 'pizza' crusts  -Try different carb alternatives with cottage cheese  -Take your Vitamin D3/Vitamin K every other day instead of every day since vitamin D in your capsule is 10,000 international units. Get your Vitamin D rechecked in about 3-4 months to see if we need to adjust dosage  -Reta: low carb bread found at [facility] (no sugar added) or unsweetened jelly is great! ([employer]'s All Fruit preserves is a great brand)      Monitoring/Evaluation    Monitor the following:  Weight  Sleep  Stress Management  Movement  Nutrient intake in reference to meal plan    Communicated with healthcare provider and documented plan for referral to appropriate agency/healthcare provider as needed    Supervising Physician: Joe Dueñas MD    Patient motivation, anticipated barriers, expected compliance: Patient is motivated and has verbalized understanding and intent to comply.     Comprehension: good     Follow-up: early February 2025

## 2024-12-10 ENCOUNTER — HOSPITAL ENCOUNTER (EMERGENCY)
Facility: HOSPITAL | Age: 54
Discharge: HOME | End: 2024-12-10
Attending: EMERGENCY MEDICINE
Payer: COMMERCIAL

## 2024-12-10 ENCOUNTER — PATIENT MESSAGE (OUTPATIENT)
Dept: INTERNAL MEDICINE | Facility: CLINIC | Age: 54
End: 2024-12-10
Payer: COMMERCIAL

## 2024-12-10 ENCOUNTER — PATIENT MESSAGE (OUTPATIENT)
Dept: ADMINISTRATIVE | Facility: OTHER | Age: 54
End: 2024-12-10
Payer: COMMERCIAL

## 2024-12-10 VITALS
WEIGHT: 173 LBS | TEMPERATURE: 98 F | HEIGHT: 65 IN | RESPIRATION RATE: 18 BRPM | SYSTOLIC BLOOD PRESSURE: 198 MMHG | OXYGEN SATURATION: 96 % | BODY MASS INDEX: 28.82 KG/M2 | DIASTOLIC BLOOD PRESSURE: 88 MMHG | HEART RATE: 68 BPM

## 2024-12-10 DIAGNOSIS — R42 DIZZINESS: ICD-10-CM

## 2024-12-10 DIAGNOSIS — R03.0 ELEVATED BLOOD PRESSURE READING: ICD-10-CM

## 2024-12-10 DIAGNOSIS — E66.9 OBESITY, UNSPECIFIED CLASS, UNSPECIFIED OBESITY TYPE, UNSPECIFIED WHETHER SERIOUS COMORBIDITY PRESENT: Primary | ICD-10-CM

## 2024-12-10 DIAGNOSIS — R42 VERTIGO: Primary | ICD-10-CM

## 2024-12-10 LAB
ALBUMIN SERPL BCP-MCNC: 3.9 G/DL (ref 3.5–5.2)
ALP SERPL-CCNC: 65 U/L (ref 40–150)
ALT SERPL W/O P-5'-P-CCNC: 19 U/L (ref 10–44)
ANION GAP SERPL CALC-SCNC: 6 MMOL/L (ref 8–16)
AST SERPL-CCNC: 17 U/L (ref 10–40)
BASOPHILS # BLD AUTO: 0.04 K/UL (ref 0–0.2)
BASOPHILS NFR BLD: 0.6 % (ref 0–1.9)
BILIRUB SERPL-MCNC: 0.4 MG/DL (ref 0.1–1)
BUN SERPL-MCNC: 24 MG/DL (ref 6–20)
CALCIUM SERPL-MCNC: 9.6 MG/DL (ref 8.7–10.5)
CHLORIDE SERPL-SCNC: 110 MMOL/L (ref 95–110)
CO2 SERPL-SCNC: 26 MMOL/L (ref 23–29)
CREAT SERPL-MCNC: 0.9 MG/DL (ref 0.5–1.4)
DIFFERENTIAL METHOD BLD: ABNORMAL
EOSINOPHIL # BLD AUTO: 0.1 K/UL (ref 0–0.5)
EOSINOPHIL NFR BLD: 1.4 % (ref 0–8)
ERYTHROCYTE [DISTWIDTH] IN BLOOD BY AUTOMATED COUNT: 12.7 % (ref 11.5–14.5)
EST. GFR  (NO RACE VARIABLE): >60 ML/MIN/1.73 M^2
GLUCOSE SERPL-MCNC: 101 MG/DL (ref 70–110)
HCT VFR BLD AUTO: 40 % (ref 37–48.5)
HGB BLD-MCNC: 13.9 G/DL (ref 12–16)
IMM GRANULOCYTES # BLD AUTO: 0.02 K/UL (ref 0–0.04)
IMM GRANULOCYTES NFR BLD AUTO: 0.3 % (ref 0–0.5)
LYMPHOCYTES # BLD AUTO: 1.7 K/UL (ref 1–4.8)
LYMPHOCYTES NFR BLD: 24.9 % (ref 18–48)
MCH RBC QN AUTO: 31.7 PG (ref 27–31)
MCHC RBC AUTO-ENTMCNC: 34.8 G/DL (ref 32–36)
MCV RBC AUTO: 91 FL (ref 82–98)
MONOCYTES # BLD AUTO: 0.4 K/UL (ref 0.3–1)
MONOCYTES NFR BLD: 6.4 % (ref 4–15)
NEUTROPHILS # BLD AUTO: 4.6 K/UL (ref 1.8–7.7)
NEUTROPHILS NFR BLD: 66.4 % (ref 38–73)
NRBC BLD-RTO: 0 /100 WBC
PLATELET # BLD AUTO: 241 K/UL (ref 150–450)
PMV BLD AUTO: 10.1 FL (ref 9.2–12.9)
POTASSIUM SERPL-SCNC: 4.2 MMOL/L (ref 3.5–5.1)
PROT SERPL-MCNC: 7.1 G/DL (ref 6–8.4)
RBC # BLD AUTO: 4.39 M/UL (ref 4–5.4)
SODIUM SERPL-SCNC: 142 MMOL/L (ref 136–145)
WBC # BLD AUTO: 6.91 K/UL (ref 3.9–12.7)

## 2024-12-10 PROCEDURE — 85025 COMPLETE CBC W/AUTO DIFF WBC: CPT | Performed by: EMERGENCY MEDICINE

## 2024-12-10 PROCEDURE — 63600175 PHARM REV CODE 636 W HCPCS: Performed by: EMERGENCY MEDICINE

## 2024-12-10 PROCEDURE — 99284 EMERGENCY DEPT VISIT MOD MDM: CPT | Mod: 25

## 2024-12-10 PROCEDURE — 93005 ELECTROCARDIOGRAM TRACING: CPT

## 2024-12-10 PROCEDURE — 96375 TX/PRO/DX INJ NEW DRUG ADDON: CPT

## 2024-12-10 PROCEDURE — 25000003 PHARM REV CODE 250: Performed by: EMERGENCY MEDICINE

## 2024-12-10 PROCEDURE — 80053 COMPREHEN METABOLIC PANEL: CPT | Performed by: EMERGENCY MEDICINE

## 2024-12-10 PROCEDURE — 96374 THER/PROPH/DIAG INJ IV PUSH: CPT

## 2024-12-10 PROCEDURE — 93010 ELECTROCARDIOGRAM REPORT: CPT | Mod: ,,, | Performed by: INTERNAL MEDICINE

## 2024-12-10 RX ORDER — DIAZEPAM 10 MG/2ML
2 INJECTION INTRAMUSCULAR
Status: COMPLETED | OUTPATIENT
Start: 2024-12-10 | End: 2024-12-10

## 2024-12-10 RX ORDER — MECLIZINE HYDROCHLORIDE 25 MG/1
50 TABLET ORAL
Status: COMPLETED | OUTPATIENT
Start: 2024-12-10 | End: 2024-12-10

## 2024-12-10 RX ORDER — MECLIZINE HYDROCHLORIDE 25 MG/1
25 TABLET ORAL 3 TIMES DAILY PRN
Qty: 20 TABLET | Refills: 0 | Status: SHIPPED | OUTPATIENT
Start: 2024-12-10

## 2024-12-10 RX ORDER — DEXAMETHASONE SODIUM PHOSPHATE 4 MG/ML
4 INJECTION, SOLUTION INTRA-ARTICULAR; INTRALESIONAL; INTRAMUSCULAR; INTRAVENOUS; SOFT TISSUE
Status: COMPLETED | OUTPATIENT
Start: 2024-12-10 | End: 2024-12-10

## 2024-12-10 RX ADMIN — DIAZEPAM 2 MG: 10 INJECTION, SOLUTION INTRAMUSCULAR; INTRAVENOUS at 03:12

## 2024-12-10 RX ADMIN — MECLIZINE HYDROCHLORIDE 50 MG: 25 TABLET ORAL at 02:12

## 2024-12-10 RX ADMIN — DEXAMETHASONE SODIUM PHOSPHATE 4 MG: 4 INJECTION INTRA-ARTICULAR; INTRALESIONAL; INTRAMUSCULAR; INTRAVENOUS; SOFT TISSUE at 03:12

## 2024-12-10 NOTE — ED PROVIDER NOTES
Encounter Date: 12/10/2024       History     Chief Complaint   Patient presents with    Dizziness     Pt presents to the ED with c/o sudden dizziness. Pt endorses ear discomfort x 1 week.      54-year-old female with history of hypothyroidism, vertigo presents to the emergency department with dizziness.  Patient reports over the last week, her ears have been bothering her.  She does take Claritin daily.  She feels like her vertigo is acting up as she has been having spells of dizziness.  She describes over the past week feeling a sense of spaciness.  She denies any vision changes, speech difficulty, unilateral numbness or weakness, chest pain, shortness of breath or fever.  The patient has been prescribed meclizine in the past.  She also reports her lips feel very dry and she feels dehydrated.    The history is provided by the patient.     Review of patient's allergies indicates:   Allergen Reactions    Codeine      Past Medical History:   Diagnosis Date    Cervical spondylosis 2022    Hypertension     Hypothyroidism     Mitral valve prolapse      Past Surgical History:   Procedure Laterality Date     SECTION      CHOLECYSTECTOMY      EPIDURAL STEROID INJECTION Bilateral 10/26/2022    Procedure: Bilateral L5 Transforaminal Epidural Steroid Injections (ref: Floyd);  Surgeon: Juan Conrad Jr., MD;  Location: [facility];  Service: Pain Management;  Laterality: Bilateral;  @1245  No ATC or DM  Needs Consent    plantar fasciitis       Family History   Problem Relation Name Age of Onset    No Known Problems Mother      No Known Problems Father      No Known Problems Sister      No Known Problems Brother      No Known Problems Maternal Aunt      No Known Problems Maternal Uncle      No Known Problems Paternal Aunt      No Known Problems Paternal Uncle      Cataracts Maternal Grandmother      No Known Problems Maternal Grandfather      No Known Problems Paternal Grandmother      No Known Problems  Paternal Grandfather      No Known Problems Other      Breast cancer Neg Hx      Colon cancer Neg Hx      Ovarian cancer Neg Hx       Social History     Tobacco Use    Smoking status: Never    Smokeless tobacco: Never   Substance Use Topics    Alcohol use: Yes     Comment: rare    Drug use: No     Review of Systems   Constitutional:  Negative for chills and fever.   HENT:  Negative for congestion and sore throat.         Ears congested   Eyes:  Negative for visual disturbance.   Respiratory:  Negative for cough and shortness of breath.    Cardiovascular:  Negative for chest pain.   Gastrointestinal:  Positive for nausea (when turning head too fast). Negative for abdominal pain and vomiting.   Genitourinary:  Negative for dysuria.   Skin:  Negative for rash.   Neurological:  Positive for dizziness. Negative for speech difficulty, weakness, numbness and headaches.   Psychiatric/Behavioral:  Negative for decreased concentration.        Physical Exam     Initial Vitals [12/10/24 1417]   BP Pulse Resp Temp SpO2   (!) 152/103 76 18 98.4 °F (36.9 °C) 99 %      MAP       --         Physical Exam    Nursing note and vitals reviewed.  Constitutional: She appears well-developed and well-nourished. She is not diaphoretic. No distress.   HENT: Mouth/Throat: Oropharynx is clear and moist.   Bilateral clear middle ear effusions   Eyes: Conjunctivae and EOM are normal. Pupils are equal, round, and reactive to light.   Neck: Neck supple.   Cardiovascular:  Normal rate and regular rhythm.           Pulmonary/Chest: Breath sounds normal.   Abdominal: Abdomen is soft. There is no abdominal tenderness.   Musculoskeletal:         General: No edema.      Cervical back: Neck supple.     Neurological: She is alert and oriented to person, place, and time. She has normal strength. No cranial nerve deficit or sensory deficit. GCS score is 15. GCS eye subscore is 4. GCS verbal subscore is 5. GCS motor subscore is 6.   Ambulatory with a steady  gait.  Darin-Hallpike maneuver reproduces the patient's symptoms.   Skin: Skin is warm and dry.   Psychiatric: She has a normal mood and affect.         ED Course   Procedures  Labs Reviewed   CBC W/ AUTO DIFFERENTIAL - Abnormal       Result Value    WBC 6.91      RBC 4.39      Hemoglobin 13.9      Hematocrit 40.0      MCV 91      MCH 31.7 (*)     MCHC 34.8      RDW 12.7      Platelets 241      MPV 10.1      Immature Granulocytes 0.3      Gran # (ANC) 4.6      Immature Grans (Abs) 0.02      Lymph # 1.7      Mono # 0.4      Eos # 0.1      Baso # 0.04      nRBC 0      Gran % 66.4      Lymph % 24.9      Mono % 6.4      Eosinophil % 1.4      Basophil % 0.6      Differential Method Automated     COMPREHENSIVE METABOLIC PANEL - Abnormal    Sodium 142      Potassium 4.2      Chloride 110      CO2 26      Glucose 101      BUN 24 (*)     Creatinine 0.9      Calcium 9.6      Total Protein 7.1      Albumin 3.9      Total Bilirubin 0.4      Alkaline Phosphatase 65      AST 17      ALT 19      eGFR >60      Anion Gap 6 (*)      EKG Readings: (Independently Interpreted)   Normal sinus rhythm, rate 67 beats per minute, normal IL interval,  milliseconds, no STEMI.       Imaging Results    None          Medications   meclizine tablet 50 mg (50 mg Oral Given 12/10/24 1442)   diazePAM injection 2 mg (2 mg Intravenous Given 12/10/24 3599)   dexAMETHasone injection 4 mg (4 mg Intravenous Given 12/10/24 1551)     Medical Decision Making  54-year-old female with history of hypothyroidism, vertigo presents to the emergency department with dizziness, ear congestion.  Symptoms ongoing for the past week or so.  She has been diagnosed with vertigo in the past and has a prescription for meclizine.  On exam, the patient is well-appearing, no acute distress.  She has no focal neurologic findings, Darin-Hallpike maneuver does reproduce her symptoms.  She has clear middle ear effusions.  My overall impression is symptomatic vertigo, middle ear  effusions.  I carefully considered but doubt posterior CVA.  Other etiologies considered dehydration, electrolyte disturbance, anemia.  Workup initiated with basic labs, EKG.  Will treat with p.o. meclizine.  P.o. hydration encouraged.    Amount and/or Complexity of Data Reviewed  Labs: ordered.     Details: No leukocytosis, normal H&H, normal platelet count.  CMP with mild elevation of the BUN of 24.  Otherwise within normal limits.  ECG/medicine tests: ordered and independent interpretation performed.    Risk  Prescription drug management.    Patient reassessed.  She is feeling improved, still with some dizziness.  Prior to discharge, patient's blood pressure is elevated.  She denies severe headache, visual changes, chest pain or shortness of breath.  Advised to follow-up with PCP for blood pressure check.  Will provide information for low-salt diet.  Return precautions reviewed.           ED Course as of 12/10/24 1757   Tue Dec 10, 2024   1527 Patient reassessed, we reviewed test results.  Blood pressure is elevated, patient reports she just had lunch prior to coming to the ED which included fried green tomatoes and shrimp.  I recommend follow-up with the primary care physician to recheck blood pressure in 1-2 weeks.  Patient denies dizziness at rest but states if she tries to get up she will feel dizzy again.  Will treat with Valium and steroid. [LH]      ED Course User Index  [LH] Isabel Bell MD                           Clinical Impression:  Final diagnoses:  [R42] Dizziness  [R42] Vertigo (Primary)  [R03.0] Elevated blood pressure reading       This dictation has been generated using M-Modal Fluency Direct dictation; some phonetic errors may occur.      ED Disposition Condition    Discharge Stable          ED Prescriptions       Medication Sig Dispense Start Date End Date Auth. Provider    meclizine (ANTIVERT) 25 mg tablet Take 1 tablet (25 mg total) by mouth 3 (three) times daily as needed for  Dizziness or Nausea. 20 tablet 12/10/2024 -- Isabel Bell MD          Follow-up Information       Follow up With Specialties Details Why Contact Info    Parish Farooq MD Internal Medicine, Wound Care On 1/9/2025 To recheck your symptoms, To recheck your blood pressure [address] [facility] [address]  [phone]      Dalila Kathleen MD Otolaryngology   [address] [place] [id number] [phone]      [facility] Emergency Dept Emergency Medicine  As needed, If symptoms worsen [address] [facility] [place] [address] [phone]             Isabel Bell MD  12/10/24 0468

## 2024-12-10 NOTE — DISCHARGE INSTRUCTIONS
Thank you for coming to our Emergency Department today. It is important to remember that some problems are difficult to diagnose and may not be found during your Emergency Department visit. Be sure to follow up with your primary care doctor and review all labs/imaging/tests that were performed during this visit with them. Some labs/tests may be outside of the normal range and require non-emergent follow-up and further investigation to help diagnose/exclude/prevent complications or other medical conditions.    If you do not have a primary care doctor, you may contact the one listed on your discharge paperwork or you may also call the [facility] Appointment Desk at [phone] to schedule an appointment and establish care with one. It is important to your health that you have a primary care doctor.    Medicaid Escalation Line:   [phone] - Please contact this number if you are having difficulty getting follow up with a Primary Care Provider or Speciality Provider.     Please take all medications as directed. All medications may potentially have side-effects and it is impossible to predict which medications may give you side-effects or what side-effects (if any) they will give you.. If you feel that you are having a negative effect or side-effect of any medication you should immediately stop taking them and seek medical attention. If you feel that you are having a life-threatening reaction call [phone].    Return to the ER with any questions/concerns, new/concerning symptoms, worsening or failure to improve.     Do not drive, swim, climb to height, take a bath or make any important decisions for 24 hours if you have received any pain medications, sedatives or mood altering drugs during your ER visit.

## 2024-12-10 NOTE — ED NOTES
Faith Johnson, a 54 y.o. female presents to the ED w/ complaint of dizziness with position change beginning today. Pt also c/o ear discomfort x 1 week. Patient is AAOx3, VSS, NAD. Denies CP, SOB, N/V/D/C, cough, fever, numbness, or tingling. Bed locked, in lowest position, bed rails up x2, call light in reach, all monitoring attached.    Triage note:  Chief Complaint   Patient presents with    Dizziness     Pt presents to the ED with c/o sudden dizziness. Pt endorses ear discomfort x 1 week.      Review of patient's allergies indicates:   Allergen Reactions    Codeine      Past Medical History:   Diagnosis Date    Cervical spondylosis 11/7/2022    Hypertension     Hypothyroidism     Mitral valve prolapse

## 2024-12-11 ENCOUNTER — TELEPHONE (OUTPATIENT)
Dept: OTOLARYNGOLOGY | Facility: CLINIC | Age: 54
End: 2024-12-11
Payer: COMMERCIAL

## 2024-12-11 LAB
OHS QRS DURATION: 88 MS
OHS QTC CALCULATION: 416 MS

## 2024-12-11 RX ORDER — TIRZEPATIDE 7.5 MG/.5ML
7.5 INJECTION, SOLUTION SUBCUTANEOUS
Qty: 2 ML | Refills: 2 | Status: ACTIVE | OUTPATIENT
Start: 2024-12-11

## 2024-12-11 NOTE — TELEPHONE ENCOUNTER
Pt confirmed general tolerability with Zepbound 5 mg. To avoid unnecessary delays in titration, sent order to [facility] for Zepbound 7.5 mg. Pt instructed to schedule visit with [facility].

## 2024-12-11 NOTE — TELEPHONE ENCOUNTER
----- Message from Ramesh sent at 12/11/2024 11:29 AM CST -----  Contact: Pt  .Type:  Needs Medical Advice    Who Called: pt  Symptoms (please be specific): Vertigo [R42]     Would the patient rather a call back or a response via [facility]?  Call back   Best Call Back Number: [phone]  Additional Information:  Donna has a referral in the system was seen in the emergency room on 12/10/2024 .

## 2024-12-11 NOTE — TELEPHONE ENCOUNTER
Spoke with pt got her a hearing test appt and follow up with Mrs. Porter Villasenor on 12/12/2024, pt verb understanding

## 2024-12-12 ENCOUNTER — OFFICE VISIT (OUTPATIENT)
Dept: OTOLARYNGOLOGY | Facility: CLINIC | Age: 54
End: 2024-12-12
Payer: COMMERCIAL

## 2024-12-12 ENCOUNTER — CLINICAL SUPPORT (OUTPATIENT)
Dept: AUDIOLOGY | Facility: CLINIC | Age: 54
End: 2024-12-12
Payer: COMMERCIAL

## 2024-12-12 VITALS
HEIGHT: 65 IN | DIASTOLIC BLOOD PRESSURE: 84 MMHG | BODY MASS INDEX: 29.68 KG/M2 | WEIGHT: 178.13 LBS | SYSTOLIC BLOOD PRESSURE: 136 MMHG

## 2024-12-12 DIAGNOSIS — H93.293 ABNORMAL AUDITORY PERCEPTION OF BOTH EARS: Primary | ICD-10-CM

## 2024-12-12 DIAGNOSIS — J30.2 SEASONAL ALLERGIC RHINITIS, UNSPECIFIED TRIGGER: ICD-10-CM

## 2024-12-12 DIAGNOSIS — R42 DIZZINESS: Primary | ICD-10-CM

## 2024-12-12 PROCEDURE — 99204 OFFICE O/P NEW MOD 45 MIN: CPT | Mod: S$GLB,,, | Performed by: NURSE PRACTITIONER

## 2024-12-12 PROCEDURE — 3044F HG A1C LEVEL LT 7.0%: CPT | Mod: CPTII,S$GLB,, | Performed by: NURSE PRACTITIONER

## 2024-12-12 PROCEDURE — 3079F DIAST BP 80-89 MM HG: CPT | Mod: CPTII,S$GLB,, | Performed by: NURSE PRACTITIONER

## 2024-12-12 PROCEDURE — 3075F SYST BP GE 130 - 139MM HG: CPT | Mod: CPTII,S$GLB,, | Performed by: NURSE PRACTITIONER

## 2024-12-12 PROCEDURE — 1159F MED LIST DOCD IN RCRD: CPT | Mod: CPTII,S$GLB,, | Performed by: NURSE PRACTITIONER

## 2024-12-12 PROCEDURE — 3008F BODY MASS INDEX DOCD: CPT | Mod: CPTII,S$GLB,, | Performed by: NURSE PRACTITIONER

## 2024-12-12 NOTE — PROGRESS NOTES
OTOLARYNGOLOGY CLINIC NOTE  Date:  2024     Chief complaint:  Chief Complaint   Patient presents with    Dizziness     History of Present Illness  Faith Johnson is a 54 y.o. female  presenting today for a new evaluation and treatment of dizziness.  Pt reports dizziness started about a week  ago with her feeling off balance or not herself.  Pt went to ER 2 days ago and her blood pressure was 198/88.  Pt was given medication to lower her blood pressure and meclizine to help with the dizziness.  Pt has long history of sinus problems and allergies.  Pt has been allergy tested and treated with steroid injections in the past.  Pt uses nasal spray and has used [place] pot in the past.  Pt reports since her blood pressure has come down she has been feeling better.  Pt has not had any more episodes of dizziness since then.      Review of medical records and prior documentation  Past medical records were reviewed with data pertinent to the chief complaint summarized in the HPI. Information obtained from review of medical records is attributed to respective sources in the HPI with reference to sources of information at their mention. Records reviewed included all recent notes from referring provider, primary care, and related subspecialty evaluations as available. This review of records was performed and additional data obtained to supplement history obtained from the patient and further inform medical decision making involved in formulating a plan of care accounting for all history and treatment relevant to the issues addressed.    Past Medical History  Past Medical History:   Diagnosis Date    Cervical spondylosis 2022    Hypertension     Hypothyroidism     Mitral valve prolapse       Past Surgical History  Past Surgical History:   Procedure Laterality Date     SECTION      CHOLECYSTECTOMY      EPIDURAL STEROID INJECTION Bilateral 10/26/2022    Procedure: Bilateral L5 Transforaminal Epidural Steroid  Injections (ref: Floyd);  Surgeon: Juan Conrad Jr., MD;  Location: [facility];  Service: Pain Management;  Laterality: Bilateral;  @1241  No ATC or DM  Needs Consent    plantar fasciitis        Medications  Current Outpatient Medications on File Prior to Visit   Medication Sig Dispense Refill    azelastine (ASTELIN) 137 mcg (0.1 %) nasal spray 1 spray (137 mcg total) by Nasal route 2 (two) times daily. 30 mL 2    clobetasol 0.05% (TEMOVATE) 0.05 % Oint APPLY TOPICALLY TO THE AFFECTED AREAS OF HANDS TWICE DAILY 60 g 1    desloratadine (CLARINEX) 5 mg tablet Take 1 tablet (5 mg total) by mouth once daily. 90 tablet 1    EScitalopram oxalate (LEXAPRO) 20 MG tablet Take 1 tablet (20 mg total) by mouth once daily. 90 tablet 1    estradioL (ESTRACE) 1 MG tablet Take 1 tablet (1 mg total) by mouth once daily. 90 tablet 4    fluticasone propionate (FLONASE) 50 mcg/actuation nasal spray Use 1 spray in each nostril daily 16 g 2    levothyroxine (SYNTHROID) 100 MCG tablet Take 1 tablet by mouth once daily 90 tablet 3    meclizine (ANTIVERT) 25 mg tablet Take 1 tablet (25 mg total) by mouth 3 (three) times daily as needed for Dizziness or Nausea. 20 tablet 0    meloxicam (MOBIC) 7.5 MG tablet Take 1 tablet by mouth daily 30 tablet 11    meloxicam (MOBIC) 7.5 MG tablet Take 1 tablet by mouth daily 30 tablet 11    ondansetron (ZOFRAN) 4 MG tablet Take 1 tablet by mouth every 8 hours as needed for nausea/vomiting 20 tablet 1    ondansetron (ZOFRAN-ODT) 4 MG TbDL Dissolve 1 tablet (4 mg total) by mouth every 4 (four) hours as needed (nausea/ vomiting). 30 tablet 3    tirzepatide, weight loss, (ZEPBOUND) 7.5 mg/0.5 mL PnIj Inject 7.5 mg into the skin every 7 days. 2 mL 2    tiZANidine (ZANAFLEX) 4 MG tablet Take 1 tablet by mouth at bedtime as needed for spasm 30 tablet 0    tiZANidine (ZANAFLEX) 4 MG tablet Take 1 tablet by mouth at bedtime as needed for spasm 30 tablet 0     Current Facility-Administered Medications on  "File Prior to Visit   Medication Dose Route Frequency Provider Last Rate Last Admin    levonorgestrel 20 mcg/24 hours (5 yrs) 52 mg IUD 1 Intra Uterine Device  1 Intra Uterine Device Intrauterine  Ashanti Bee MD   1 Intra Uterine Device at 05/23/19 1527     Review of Systems  Review of Systems   Constitutional: Negative.    HENT: Negative.     Eyes: Negative.    Respiratory: Negative.     Cardiovascular: Negative.    Gastrointestinal: Negative.    Genitourinary: Negative.    Musculoskeletal:  Positive for back pain.   Skin: Negative.    Psychiatric/Behavioral: Negative.        Social History   reports that she has never smoked. She has never used smokeless tobacco. She reports current alcohol use. She reports that she does not use drugs.     Family History  Family History   Problem Relation Name Age of Onset    No Known Problems Mother      No Known Problems Father      No Known Problems Sister      No Known Problems Brother      No Known Problems Maternal Aunt      No Known Problems Maternal Uncle      No Known Problems Paternal Aunt      No Known Problems Paternal Uncle      Cataracts Maternal Grandmother      No Known Problems Maternal Grandfather      No Known Problems Paternal Grandmother      No Known Problems Paternal Grandfather      No Known Problems Other      Breast cancer Neg Hx      Colon cancer Neg Hx      Ovarian cancer Neg Hx        Physical Exam   Vitals:    12/12/24 1044   BP: 136/84    Body mass index is 29.64 kg/m².  Weight: 80.8 kg (178 lb 2.1 oz)   Height: 5' 5" (165.1 cm)     GENERAL: no acute distress.  HEAD: normocephalic.   EYES: lids and lashes normal. No scleral icterus  EARS: external ear without lesion, normal pinna shape and position.  External auditory canal with normal cerumen, tympanic membrane fully visible, no perforation , no retraction. No middle ear effusion. Ossicles intact.   NOSE: external nose without significant bony abnormality  ORAL CAVITY/OROPHARYNX: tongue " midline and mobile. Symmetric palate rise. Uvula midline.   NECK: trachea midline.   LYMPH NODES:No cervical lymphadenopathy.  RESPIRATORY: no stridor, no stertor. Voice normal. Respirations nonlabored.  NEURO: alert, responds to questions appropriately.   Cranial nerve exam as indicated in above sections and additionally showed facial movement symmetric with good eye closure and symmetric smile.   PSYCH:mood appropriate    Zar-Bzcv-Fupv:  Negative on left and right for torsional and up-beating nystagmus   Tandem Gait: normal  Finger to nose & finger to finger test:  No Dysmetria  Rapid alternating movements:  No Dysdiadochokinesia  Romberg:  Negative    Imaging:  The patient does not have any pertinent and/or recent imaging of the head and neck.     Labs:  CBC  Recent Labs   Lab 05/28/24  1151 08/15/24  0920 12/10/24  1450   WBC 5.26  5.26 4.71 6.91   Hemoglobin 13.8  13.8 14.0 13.9   Hematocrit 42.2  42.2 41.5 40.0   MCV 92  92 93 91   Platelets 246  246 244 241     BMP  Recent Labs   Lab 05/28/24  1151 08/15/24  0920 12/10/24  1450   Glucose 88  88 105 101   Sodium 138  138 141 142   Potassium 4.2  4.2 4.2 4.2   Chloride 108  108 109 110   CO2 24  24 23 26   BUN 16  16 18 24 H   Creatinine 0.9  0.9 0.8 0.9   Calcium 9.3  9.3 9.3 9.6         AUDIOLOGY RESULTS  Audiometric evaluation including audiogram, tympanometry, acoustic reflexes, and speech discrimination which was performed  was personally reviewed and interpreted.  Notable findings on the audiogram were essentially normal hearing bilaterally.  Tympanometry revealed Type Ad tympanogram on the left and Type A tympanogram on the right. Speech discrimination was 100% on the left, and 100% on the right.   Report of the audiologist performing this audiometric testing was also reviewed.     Assessment  1. Dizziness    2. Seasonal allergic rhinitis, unspecified trigger     Plan:  Dizziness:  I had a long discussion with the patient regarding their  symptoms. Dizziness can result from a peripheral vestibular disorder (a dysfunction of the balance organs of the inner ear) or central vestibular disorder (a dysfunction of one or more parts of the central nervous system that help process balance and spatial information). There are also non-vestibular causes of dizziness, and dizziness can be linked to a wide array of problems such as blood-flow irregularities from cardiovascular problems and other cardiac issue and possible neurologic issue.   Exam shows a normal functioning vestibular system and no evidence of BPPV.  Pt advised elevated blood pressure was likely cause of her dizziness.  Pt advised to continue antihistamines and nasal sprays as ordered and f/u prn.    AR:  Pt advised to continue nasal sprays and nasal rinse as advised.  Pt advised of proper method of administering medication and when to take medication.  F/u prn.  Discussed plan of care with patient in detail and all questions answered. Patient reported understanding of plan of care.

## 2024-12-12 NOTE — PROGRESS NOTES
Please click on link to view Audiogram:  Document on 12/12/2024 10:43 AM by Pam Lr AU.D: Audiogram    Ms. Faith Johnson, a 54 y.o. female, was seen in the clinic today for an audiological evaluation.     Otoscopy clear bilaterally. Tympanometry testing revealed a Type A tympanogram for the right ear and a Type A tympanogram for the left ear.     Audiological testing revealed normal hearing sensitivity bilaterally. A speech reception threshold was obtained at 20 dBHL for the right ear and at 15 dBHL for the left ear. Speech discrimination was 100% for the right ear and 100% for the left ear.      Recommendations:  1. Otologic evaluation  2. Annual audiological evaluation, sooner if medically necessary or if hearing changes  3. Hearing protection when in noise

## 2024-12-16 ENCOUNTER — OFFICE VISIT (OUTPATIENT)
Dept: FAMILY MEDICINE | Facility: CLINIC | Age: 54
End: 2024-12-16
Payer: COMMERCIAL

## 2024-12-16 VITALS
BODY MASS INDEX: 29.61 KG/M2 | SYSTOLIC BLOOD PRESSURE: 115 MMHG | TEMPERATURE: 98 F | HEIGHT: 65 IN | WEIGHT: 177.69 LBS | RESPIRATION RATE: 15 BRPM | HEART RATE: 74 BPM | OXYGEN SATURATION: 95 % | DIASTOLIC BLOOD PRESSURE: 80 MMHG

## 2024-12-16 DIAGNOSIS — E66.9 OBESITY, UNSPECIFIED CLASS, UNSPECIFIED OBESITY TYPE, UNSPECIFIED WHETHER SERIOUS COMORBIDITY PRESENT: ICD-10-CM

## 2024-12-16 DIAGNOSIS — I10 BENIGN HYPERTENSION: Primary | ICD-10-CM

## 2024-12-16 DIAGNOSIS — R42 VERTIGO: ICD-10-CM

## 2024-12-16 PROCEDURE — 1160F RVW MEDS BY RX/DR IN RCRD: CPT | Mod: CPTII,S$GLB,, | Performed by: NURSE PRACTITIONER

## 2024-12-16 PROCEDURE — 3079F DIAST BP 80-89 MM HG: CPT | Mod: CPTII,S$GLB,, | Performed by: NURSE PRACTITIONER

## 2024-12-16 PROCEDURE — 99213 OFFICE O/P EST LOW 20 MIN: CPT | Mod: S$GLB,,, | Performed by: NURSE PRACTITIONER

## 2024-12-16 PROCEDURE — 3044F HG A1C LEVEL LT 7.0%: CPT | Mod: CPTII,S$GLB,, | Performed by: NURSE PRACTITIONER

## 2024-12-16 PROCEDURE — 1159F MED LIST DOCD IN RCRD: CPT | Mod: CPTII,S$GLB,, | Performed by: NURSE PRACTITIONER

## 2024-12-16 PROCEDURE — 99999 PR PBB SHADOW E&M-EST. PATIENT-LVL V: CPT | Mod: PBBFAC,,, | Performed by: NURSE PRACTITIONER

## 2024-12-16 PROCEDURE — 3074F SYST BP LT 130 MM HG: CPT | Mod: CPTII,S$GLB,, | Performed by: NURSE PRACTITIONER

## 2024-12-16 PROCEDURE — 3008F BODY MASS INDEX DOCD: CPT | Mod: CPTII,S$GLB,, | Performed by: NURSE PRACTITIONER

## 2024-12-16 NOTE — PROGRESS NOTES
Routine Office Visit    Patient Name: Faith Johnson    : 1970  MRN: [id number]    Chief Complaint:  ER follow-up, vertigo, hypertension    Subjective:  Faith is a 54 y.o. female who presents today for:    ER follow-up, vertigo, hypertension - patient who is known to me medical problems as documented below reports today for evaluation.  Here for follow-up from emergency room visit last week.  Last week she went to the ER vertigo and had elevated blood pressures there systolic 150s.  She does have some chronic intermittent sinus symptoms and had ear effusions treated with antihistamine.  She did see ENT where she had a negative Muscoda-Hallpike maneuver.  She notes that the vertigo is improved almost to resolution but it is still happening with certain head movements.  Denies any persistent symptoms and she does not report any nausea or vomiting.  No reported ear pain or discharge.    She notes that her blood pressures at home have been improving.  BP yesterday was 116/68.  She notes that she used to be on blood pressure medicine about 20 years ago but does not remember the name of this.  She has been losing weight with zepbound and has been feeling more confident about herself.  She notes things has been going well at work and at home.  About 2 weeks prior to the ER visit she did stop her Lexapro 20 mg abruptly because she felt she did not need it anymore.  She became concerned that the elevated BP/vertigo was due to the abrupt discontinuation of her Lexapro so she did restart the 20 mg Lexapro about 3 days ago.  However, she states she does not feel she needs this medication at this time because she is not having any significant anxiety or depression.    Past Medical History  Past Medical History:   Diagnosis Date    Cervical spondylosis 2022    Hypertension     Hypothyroidism     Mitral valve prolapse        Family History  Family History   Problem Relation Name Age of Onset    No Known Problems Mother       No Known Problems Father      No Known Problems Sister      No Known Problems Brother      No Known Problems Maternal Aunt      No Known Problems Maternal Uncle      No Known Problems Paternal Aunt      No Known Problems Paternal Uncle      Cataracts Maternal Grandmother      No Known Problems Maternal Grandfather      No Known Problems Paternal Grandmother      No Known Problems Paternal Grandfather      No Known Problems Other      Breast cancer Neg Hx      Colon cancer Neg Hx      Ovarian cancer Neg Hx         Current Medications  Current Outpatient Medications on File Prior to Visit   Medication Sig Dispense Refill    azelastine (ASTELIN) 137 mcg (0.1 %) nasal spray 1 spray (137 mcg total) by Nasal route 2 (two) times daily. 30 mL 2    clobetasol 0.05% (TEMOVATE) 0.05 % Oint APPLY TOPICALLY TO THE AFFECTED AREAS OF HANDS TWICE DAILY 60 g 1    desloratadine (CLARINEX) 5 mg tablet Take 1 tablet (5 mg total) by mouth once daily. 90 tablet 1    EScitalopram oxalate (LEXAPRO) 20 MG tablet Take 1 tablet (20 mg total) by mouth once daily. 90 tablet 1    estradioL (ESTRACE) 1 MG tablet Take 1 tablet (1 mg total) by mouth once daily. 90 tablet 4    fluticasone propionate (FLONASE) 50 mcg/actuation nasal spray Use 1 spray in each nostril daily 16 g 2    levothyroxine (SYNTHROID) 100 MCG tablet Take 1 tablet by mouth once daily 90 tablet 3    meclizine (ANTIVERT) 25 mg tablet Take 1 tablet (25 mg total) by mouth 3 (three) times daily as needed for Dizziness or Nausea. 20 tablet 0    meloxicam (MOBIC) 7.5 MG tablet Take 1 tablet by mouth daily 30 tablet 11    meloxicam (MOBIC) 7.5 MG tablet Take 1 tablet by mouth daily 30 tablet 11    ondansetron (ZOFRAN) 4 MG tablet Take 1 tablet by mouth every 8 hours as needed for nausea/vomiting 20 tablet 1    ondansetron (ZOFRAN-ODT) 4 MG TbDL Dissolve 1 tablet (4 mg total) by mouth every 4 (four) hours as needed (nausea/ vomiting). 30 tablet 3    tirzepatide, weight loss,  "(ZEPBOUND) 7.5 mg/0.5 mL PnIj Inject 7.5 mg into the skin every 7 days. 2 mL 2    tiZANidine (ZANAFLEX) 4 MG tablet Take 1 tablet by mouth at bedtime as needed for spasm 30 tablet 0    tiZANidine (ZANAFLEX) 4 MG tablet Take 1 tablet by mouth at bedtime as needed for spasm 30 tablet 0     Current Facility-Administered Medications on File Prior to Visit   Medication Dose Route Frequency Provider Last Rate Last Admin    levonorgestrel 20 mcg/24 hours (5 yrs) 52 mg IUD 1 Intra Uterine Device  1 Intra Uterine Device Intrauterine  Ashanti Bee MD   1 Intra Uterine Device at 05/23/19 1527       Allergies   Review of patient's allergies indicates:   Allergen Reactions    Codeine        Review of Systems (Pertinent positives)  Review of Systems   Constitutional:  Negative for chills and fever.   HENT: Negative.  Negative for ear discharge, ear pain, hearing loss and tinnitus.    Eyes: Negative.    Respiratory: Negative.     Cardiovascular:  Negative for chest pain, palpitations, orthopnea, claudication and leg swelling.   Gastrointestinal: Negative.    Genitourinary: Negative.    Musculoskeletal: Negative.    Neurological:  Positive for dizziness. Negative for tingling, tremors, sensory change and headaches.   Psychiatric/Behavioral: Negative.         /80 (BP Location: Right arm, Patient Position: Sitting)   Pulse 74   Temp 98.3 °F (36.8 °C) (Oral)   Resp 15   Ht 5' 5" (1.651 m)   Wt 80.6 kg (177 lb 11.1 oz)   LMP  (LMP Unknown)   SpO2 95%   BMI 29.57 kg/m²     Physical Exam  Vitals reviewed.   Constitutional:       General: She is not in acute distress.     Appearance: Normal appearance. She is not ill-appearing, toxic-appearing or diaphoretic.   HENT:      Head: Normocephalic and atraumatic.      Right Ear: Tympanic membrane, ear canal and external ear normal.      Left Ear: Tympanic membrane, ear canal and external ear normal.      Nose: Nose normal. No congestion or rhinorrhea.      Mouth/Throat: "      Mouth: Mucous membranes are moist.      Pharynx: Oropharynx is clear. No oropharyngeal exudate or posterior oropharyngeal erythema.   Cardiovascular:      Rate and Rhythm: Normal rate and regular rhythm.      Pulses: Normal pulses.      Heart sounds: Normal heart sounds.   Pulmonary:      Effort: Pulmonary effort is normal. No respiratory distress.      Breath sounds: Normal breath sounds. No wheezing.   Abdominal:      General: Bowel sounds are normal. There is no distension.      Palpations: Abdomen is soft.      Tenderness: There is no abdominal tenderness.   Musculoskeletal:         General: No swelling, tenderness or deformity. Normal range of motion.   Skin:     General: Skin is warm and dry.      Capillary Refill: Capillary refill takes less than 2 seconds.   Neurological:      General: No focal deficit present.      Mental Status: She is alert and oriented to person, place, and time.   Psychiatric:         Mood and Affect: Mood normal.         Behavior: Behavior normal.        No carotid bruits on examination.    Assessment/Plan:  Faith Johnson is a 54 y.o. female who presents today for :    Faith was seen today for follow-up and hypertension.    Diagnoses and all orders for this visit:    Benign hypertension    Obesity, unspecified class, unspecified obesity type, unspecified whether serious comorbidity present    Vertigo    I had a detailed discussion with the patient regarding her symptoms and further workup/treatment going forward.  Her BP is improved today.  Recommended patient to keep monitoring her blood pressure 2 to 3 times a week and if increasing we can consider starting a BP medication as needed.  Her vertigo is improving.  Will defer head imaging or other workup for this right now.  She has seen ENT with negative workup there.  As for her Lexapro, recommended patient to take a half tablet of the 20 mg for 1-2 weeks then discontinue altogether since her anxiety is much improved.  I  congratulated the patient regarding her efforts for weight loss in improving her health.  All questions answered.  Recommended patient to follow up with me as needed in the future.        This office note has been dictated.  This dictation has been generated using M-Modal Fluency Direct dictation; some phonetic errors may occur.

## 2025-01-03 ENCOUNTER — PATIENT MESSAGE (OUTPATIENT)
Dept: INTERNAL MEDICINE | Facility: CLINIC | Age: 55
End: 2025-01-03
Payer: COMMERCIAL

## 2025-01-08 ENCOUNTER — LAB VISIT (OUTPATIENT)
Dept: LAB | Facility: HOSPITAL | Age: 55
End: 2025-01-08
Attending: INTERNAL MEDICINE
Payer: COMMERCIAL

## 2025-01-08 ENCOUNTER — OFFICE VISIT (OUTPATIENT)
Dept: FAMILY MEDICINE | Facility: CLINIC | Age: 55
End: 2025-01-08
Payer: COMMERCIAL

## 2025-01-08 VITALS
SYSTOLIC BLOOD PRESSURE: 134 MMHG | HEART RATE: 88 BPM | DIASTOLIC BLOOD PRESSURE: 76 MMHG | TEMPERATURE: 98 F | BODY MASS INDEX: 28.43 KG/M2 | WEIGHT: 170.63 LBS | HEIGHT: 65 IN | OXYGEN SATURATION: 95 %

## 2025-01-08 DIAGNOSIS — Z12.11 SCREENING FOR COLON CANCER: ICD-10-CM

## 2025-01-08 DIAGNOSIS — E66.9 OBESITY, UNSPECIFIED CLASS, UNSPECIFIED OBESITY TYPE, UNSPECIFIED WHETHER SERIOUS COMORBIDITY PRESENT: ICD-10-CM

## 2025-01-08 DIAGNOSIS — F41.9 ANXIETY: ICD-10-CM

## 2025-01-08 DIAGNOSIS — E03.9 ACQUIRED HYPOTHYROIDISM: ICD-10-CM

## 2025-01-08 DIAGNOSIS — Z11.59 NEED FOR HEPATITIS C SCREENING TEST: ICD-10-CM

## 2025-01-08 DIAGNOSIS — E03.9 ACQUIRED HYPOTHYROIDISM: Primary | ICD-10-CM

## 2025-01-08 LAB
ALBUMIN SERPL BCP-MCNC: 4.1 G/DL (ref 3.5–5.2)
ALP SERPL-CCNC: 68 U/L (ref 40–150)
ALT SERPL W/O P-5'-P-CCNC: 20 U/L (ref 10–44)
ANION GAP SERPL CALC-SCNC: 10 MMOL/L (ref 8–16)
AST SERPL-CCNC: 18 U/L (ref 10–40)
BILIRUB SERPL-MCNC: 0.6 MG/DL (ref 0.1–1)
BUN SERPL-MCNC: 20 MG/DL (ref 6–20)
CALCIUM SERPL-MCNC: 10.5 MG/DL (ref 8.7–10.5)
CHLORIDE SERPL-SCNC: 109 MMOL/L (ref 95–110)
CO2 SERPL-SCNC: 23 MMOL/L (ref 23–29)
CREAT SERPL-MCNC: 0.9 MG/DL (ref 0.5–1.4)
ERYTHROCYTE [DISTWIDTH] IN BLOOD BY AUTOMATED COUNT: 12.8 % (ref 11.5–14.5)
EST. GFR  (NO RACE VARIABLE): >60 ML/MIN/1.73 M^2
GLUCOSE SERPL-MCNC: 91 MG/DL (ref 70–110)
HCT VFR BLD AUTO: 40.7 % (ref 37–48.5)
HCV AB SERPL QL IA: NORMAL
HGB BLD-MCNC: 13.6 G/DL (ref 12–16)
MCH RBC QN AUTO: 29.9 PG (ref 27–31)
MCHC RBC AUTO-ENTMCNC: 33.4 G/DL (ref 32–36)
MCV RBC AUTO: 90 FL (ref 82–98)
PLATELET # BLD AUTO: 258 K/UL (ref 150–450)
PMV BLD AUTO: 9.9 FL (ref 9.2–12.9)
POTASSIUM SERPL-SCNC: 4.2 MMOL/L (ref 3.5–5.1)
PROT SERPL-MCNC: 7.2 G/DL (ref 6–8.4)
RBC # BLD AUTO: 4.55 M/UL (ref 4–5.4)
SODIUM SERPL-SCNC: 142 MMOL/L (ref 136–145)
TSH SERPL DL<=0.005 MIU/L-ACNC: 1.98 UIU/ML (ref 0.4–4)
WBC # BLD AUTO: 5.24 K/UL (ref 3.9–12.7)

## 2025-01-08 PROCEDURE — 99214 OFFICE O/P EST MOD 30 MIN: CPT | Mod: S$GLB,,, | Performed by: INTERNAL MEDICINE

## 2025-01-08 PROCEDURE — 80053 COMPREHEN METABOLIC PANEL: CPT | Performed by: INTERNAL MEDICINE

## 2025-01-08 PROCEDURE — 99999 PR PBB SHADOW E&M-EST. PATIENT-LVL IV: CPT | Mod: PBBFAC,,, | Performed by: INTERNAL MEDICINE

## 2025-01-08 PROCEDURE — 3078F DIAST BP <80 MM HG: CPT | Mod: CPTII,S$GLB,, | Performed by: INTERNAL MEDICINE

## 2025-01-08 PROCEDURE — 1160F RVW MEDS BY RX/DR IN RCRD: CPT | Mod: CPTII,S$GLB,, | Performed by: INTERNAL MEDICINE

## 2025-01-08 PROCEDURE — 85027 COMPLETE CBC AUTOMATED: CPT | Performed by: INTERNAL MEDICINE

## 2025-01-08 PROCEDURE — 36415 COLL VENOUS BLD VENIPUNCTURE: CPT | Performed by: INTERNAL MEDICINE

## 2025-01-08 PROCEDURE — 84443 ASSAY THYROID STIM HORMONE: CPT | Performed by: INTERNAL MEDICINE

## 2025-01-08 PROCEDURE — 3075F SYST BP GE 130 - 139MM HG: CPT | Mod: CPTII,S$GLB,, | Performed by: INTERNAL MEDICINE

## 2025-01-08 PROCEDURE — 1159F MED LIST DOCD IN RCRD: CPT | Mod: CPTII,S$GLB,, | Performed by: INTERNAL MEDICINE

## 2025-01-08 PROCEDURE — 86803 HEPATITIS C AB TEST: CPT | Performed by: INTERNAL MEDICINE

## 2025-01-08 PROCEDURE — 3008F BODY MASS INDEX DOCD: CPT | Mod: CPTII,S$GLB,, | Performed by: INTERNAL MEDICINE

## 2025-01-08 NOTE — PROGRESS NOTES
"Subjective:       Patient ID: Faith Johnson is a 54 y.o. female.    Chief Complaint: Establish Care (Sinus cold since 12/25/24.)    Est pcp    HPI: 53 y/o w hypothyroid presents alone to establish primary care. Last month had episode of vertigo with increase sinus pressure seen in ED with normal exam. Vertigo has resolved has chronic rhinitis (previously on immunotherapy) she does use nasal flushes along with nasal steroid and antihistamine spray no vision changes no loose stool diarrhea. In Oct 2024 she started tirzepatide to help with weight loss has titrated to l7.5mg weekly she has lost approximately 16lbs with being on medicine does have some reflux that improves with OTC tums off ssri will be due for IUD removal later this year     HM: CRC screening due may 2025 (previously done cologuard no personal history of colon polyps)      Review of Systems   Constitutional:  Negative for activity change, appetite change, fatigue, fever and unexpected weight change.   HENT:  Negative for ear pain, rhinorrhea and sore throat.    Eyes:  Negative for discharge and visual disturbance.   Respiratory:  Negative for chest tightness, shortness of breath and wheezing.    Cardiovascular:  Negative for chest pain, palpitations and leg swelling.   Gastrointestinal:  Negative for abdominal pain, constipation and diarrhea.   Endocrine: Negative for cold intolerance and heat intolerance.   Genitourinary:  Negative for dysuria and hematuria.   Musculoskeletal:  Negative for joint swelling and neck stiffness.   Skin:  Negative for rash.   Neurological:  Negative for dizziness, syncope, weakness and headaches.   Psychiatric/Behavioral:  Negative for suicidal ideas.        Objective:     Vitals:    01/08/25 1115   BP: 134/76   BP Location: Right arm   Patient Position: Sitting   Pulse: 88   Temp: 98.2 °F (36.8 °C)   TempSrc: Oral   SpO2: 95%   Weight: 77.4 kg (170 lb 10.2 oz)   Height: 5' 5" (1.651 m)          Physical " Exam  Constitutional:       Appearance: She is well-developed.   HENT:      Head: Normocephalic and atraumatic.   Eyes:      General: No scleral icterus.     Conjunctiva/sclera: Conjunctivae normal.   Cardiovascular:      Rate and Rhythm: Normal rate and regular rhythm.      Heart sounds: No murmur heard.     No friction rub. No gallop.   Pulmonary:      Effort: Pulmonary effort is normal.      Breath sounds: Normal breath sounds. No wheezing or rales.   Abdominal:      General: There is no distension.      Palpations: Abdomen is soft.      Tenderness: There is no abdominal tenderness. There is no guarding or rebound.   Musculoskeletal:         General: No tenderness. Normal range of motion.      Cervical back: Normal range of motion.      Right lower leg: No edema.      Left lower leg: No edema.   Skin:     General: Skin is warm and dry.   Neurological:      Mental Status: She is alert and oriented to person, place, and time.      Cranial Nerves: No cranial nerve deficit.         Assessment and Plan   1. Acquired hypothyroidism (Primary)  Clinically appears euthyroid will get tsh with next labs  - CBC Without Differential; Future  - TSH; Future    2. Obesity, unspecified class, unspecified obesity type, unspecified whether serious comorbidity present  Folowd by bariatrics continue tirzepatide titration  - Comprehensive Metabolic Panel; Future    3. Anxiety  resolved  - Comprehensive Metabolic Panel; Future    4. Screening for colon cancer  Cologuard will be mailed to patient  - Cologuard Screening (Multitarget Stool DNA); Future  - Cologuard Screening (Multitarget Stool DNA)    5. Need for hepatitis C screening test  Hep c ab with next labs  - Hepatitis C Antibody; Future

## 2025-01-15 ENCOUNTER — PATIENT MESSAGE (OUTPATIENT)
Dept: INTERNAL MEDICINE | Facility: CLINIC | Age: 55
End: 2025-01-15
Payer: COMMERCIAL

## 2025-01-27 ENCOUNTER — PATIENT MESSAGE (OUTPATIENT)
Dept: INTERNAL MEDICINE | Facility: CLINIC | Age: 55
End: 2025-01-27
Payer: COMMERCIAL

## 2025-01-28 ENCOUNTER — HOSPITAL ENCOUNTER (OUTPATIENT)
Dept: RADIOLOGY | Facility: HOSPITAL | Age: 55
Discharge: HOME | End: 2025-01-28
Attending: OBSTETRICS & GYNECOLOGY
Payer: COMMERCIAL

## 2025-01-28 DIAGNOSIS — Z12.31 BREAST CANCER SCREENING BY MAMMOGRAM: ICD-10-CM

## 2025-01-28 PROCEDURE — 77067 SCR MAMMO BI INCL CAD: CPT | Mod: 26,,, | Performed by: RADIOLOGY

## 2025-01-28 PROCEDURE — 77063 BREAST TOMOSYNTHESIS BI: CPT | Mod: 26,,, | Performed by: RADIOLOGY

## 2025-01-28 PROCEDURE — 77063 BREAST TOMOSYNTHESIS BI: CPT | Mod: TC

## 2025-02-06 ENCOUNTER — CLINICAL SUPPORT (OUTPATIENT)
Dept: NUTRITION | Facility: CLINIC | Age: 55
End: 2025-02-06
Payer: COMMERCIAL

## 2025-02-06 VITALS — BODY MASS INDEX: 27.49 KG/M2 | WEIGHT: 165 LBS | HEIGHT: 65 IN

## 2025-02-06 DIAGNOSIS — I10 BENIGN HYPERTENSION: ICD-10-CM

## 2025-02-06 DIAGNOSIS — E66.9 OBESITY (BMI 30-39.9): Primary | ICD-10-CM

## 2025-02-06 DIAGNOSIS — E66.9 OBESITY: Primary | ICD-10-CM

## 2025-02-06 DIAGNOSIS — E66.9 OBESITY: ICD-10-CM

## 2025-02-06 DIAGNOSIS — Z71.3 DIETARY COUNSELING AND SURVEILLANCE: ICD-10-CM

## 2025-02-06 PROCEDURE — 97803 MED NUTRITION INDIV SUBSEQ: CPT | Mod: 95,,, | Performed by: NUTRITIONIST

## 2025-02-06 NOTE — PROGRESS NOTES
"Nutrition Assessment    Visit Type: employee wellness consult follow-up  Session Time:  47 minutes    Reason for MNT visit: Pt in for education and nutrition counseling regarding Obesity, goal of weight loss  The patient location is: work  The chief complaint leading to consultation is: goal of body fat loss    Visit type: audiovisual    Face to Face time with patient: 47 minutes   45 minutes of total time spent on the encounter, which includes face to face time and non-face to face time preparing to see the patient (eg, review of tests), Obtaining and/or reviewing separately obtained history, Documenting clinical information in the electronic or other health record, Independently interpreting results (not separately reported) and communicating results to the patient/family/caregiver, or Care coordination (not separately reported).       Each patient to whom he or she provides medical services by telemedicine is:  (1) informed of the relationship between the physician and patient and the respective role of any other health care provider with respect to management of the patient; and (2) notified that he or she may decline to receive medical services by telemedicine and may withdraw from such care at any time.      Age: 54 y.o.  Wt:   Wt Readings from Last 1 Encounters:   02/06/25 74.8 kg (165 lb)     Ht:   Ht Readings from Last 1 Encounters:   02/06/25 5' 5" (1.651 m)     BMI:   BMI Readings from Last 1 Encounters:   02/06/25 27.46 kg/m²       Client states:      2.6.2025 virtual follow-up: She is down to 165 pounds. She stated she isn't happy with [facility], but will continue taking Zepbound. Reports still struggling trying to eat every 3-4 hours due to Zepbound medication suppressing her appetite. Reports consuming more fiber, which has helped relieve her constipation. She has made homemade veggie soup with beans and beef. She tried the cottage cheese eggs and didn't care for it. She has been making protein " kate. She tried the Eat Fit pizza from [employer] pizza and loved it. She got off Lexapro and is doing fine and instead uses Nello SuperCalm packet mixes in her water, which she loves.    12.4.2024 virtual follow-up: On week 7 on Zepbound. She is down 9 pounds so far and is doing great. She did well over Thanksgiving. She is walking every day (minimum 5 days per week) for 45 minutes with her . She is working on increasing her protein intake     10.2.2024 virtual follow-up: She got in touch with [facility], and has a consult to see if she is approved in November. Reports walking more and walks a minimum of every other day if not every day for 35-40 minutes at a decent pace. Reports trying to eat better as a family. Her weight is 182.8 pounds, which is down from where she started off at 185 pounds. Her clothes are fitting looser. Since the family is on the plan it's been easier. Stated she tried some new brands and tried making a high protein dip using cottage cheese    8.21.2024: Last seen in January 2023. She and her  want to eat healthy and lose weight. She wants to watch her carb and sugar intake and discuss rewards on the weekend. Dealing with some menopause/hormone issues. Reports losing 55 pounds from 8284-6750 and is slowly gaining the weight back since then. Thinking about joining [employer] program at [employer]. I highly encourage her to try it out and see how she likes it    Medical History  Problem List             Resolved    Atrophy of thyroid         Benign hypertension         Flank pain         Refractive error         Cervical spondylosis         Lumbar spondylosis         DDD (degenerative disc disease), cervical         DDD (degenerative disc disease), lumbar         Encounter for long-term (current) use of other medications            Past Medical History:   Diagnosis Date    Cervical spondylosis 11/7/2022    Hypertension     Hypothyroidism     Mitral valve  prolapse        Past Surgical History:   Procedure Laterality Date     SECTION      CHOLECYSTECTOMY      EPIDURAL STEROID INJECTION Bilateral 10/26/2022    Procedure: Bilateral L5 Transforaminal Epidural Steroid Injections (ref: Floyd);  Surgeon: Juan Conrad Jr., MD;  Location: [facility];  Service: Pain Management;  Laterality: Bilateral;  @1246  No ATC or DM  Needs Consent    plantar fasciitis            Medications    Prior to Admission medications    Medication Sig Start Date End Date Taking? Authorizing Provider   EScitalopram oxalate (LEXAPRO) 10 MG tablet Take 1 tablet by mouth daily 3/25/24      estradioL (ESTRACE) 1 MG tablet Take 1 tablet (1 mg total) by mouth once daily. 24   Ras Oviedo MD   fluticasone propionate (FLONASE) 50 mcg/actuation nasal spray Use 1 spray in each nostril daily 24      hydrOXYzine HCL (ATARAX) 25 MG tablet Take 1 tablet (25 mg total) by mouth 3 (three) times daily as needed for Itching or Anxiety. 24   Ras Oviedo MD   levothyroxine (SYNTHROID) 100 MCG tablet Take 1 tablet by mouth once daily 3/19/24      meloxicam (MOBIC) 7.5 MG tablet Take 1 tablet by mouth daily 23      montelukast (SINGULAIR) 10 mg tablet TAKE 1 TABLET BY MOUTH EVERY EVENING 21   Janelle Medeiros MD   ondansetron (ZOFRAN) 4 MG tablet Take 1 tablet by mouth every 8 hours as needed for nausea/vomiting 23   Janelle Medeiros MD   tiZANidine (ZANAFLEX) 4 MG tablet Take 1 tablet by mouth at bedtime as needed for spasm 10/24/22      tiZANidine (ZANAFLEX) 4 MG tablet Take 1 tablet by mouth at bedtime as needed for spasm 23           Food Allergies or Intolerances:  NKFA     Social History    Marital status:      Social History     Tobacco Use    Smoking status: Never    Smokeless tobacco: Never   Substance Use Topics    Alcohol use: Yes     Comment: rare     Current Alcohol use: every other month (1/2 glass of red wine or a low carb  beer)    Lab Reports:  Reviewed and noted     Lab Results   Component Value Date    RACFYEBH27AA 45 05/28/2024    TSH 1.982 01/08/2025    FREET4 1.08 05/28/2024    FREET4 1.08 05/28/2024    CRP 1.2 05/09/2022    SEDRATE 4 05/09/2022    AST 18 01/08/2025    ALT 20 01/08/2025    HDL 63 05/28/2024    HDL 63 05/28/2024    LDLCALC 118.8 05/28/2024    LDLCALC 118.8 05/28/2024    TRIG 106 05/28/2024    TRIG 106 05/28/2024    HGBA1C 5.1 05/28/2024         BP Readings from Last 1 Encounters:   01/08/25 134/76      24-hour Recall:      Food choices (After Midnight)  Patient eating midnight to 4am: (P) Never          Food choices (Early Morning)  Patient eats 4am to 8am: (P) Never                                 Food choices (Morning)  Patient eats 8am to noon: (P) Once or twice a week                 Fast food meals (8am - midnight): (P) none        Food choices (Afternoon)  Patient eats noon to 4pm: (P) Once or twice a week   Home cooked meals (Noon - 4pm): (P) Cheese, nuts, eggs, fruit, leftover grilled meats       Snacks (Noon to 4pm): (P) Protein Shakes        Food choices (Evening)         Patient eats 4pm to 8pm: (P) Every day          Home cooked meals (4pm - 8pm): (P) Grilled meats and veggies      Fast food meals (4pm - 8pm): (P) none           Snacks (4pm - 8pm): (P) Cheese, nuts, eggs, fruit         Food choices (Late evening)  R [facility] PEQ NUTRITION HOW OFTEN EATING LATE EVENING: (P) Once or twice a month   Home cooked meals (8pm - midnight): (P) Cheese, nuts, eggs, fruit, leftover grilled meats   Fast food meals (8pm - midnight): (P) none     Snacks (8pm - midnight: (P) Protein Shakes, Cheese, nuts, eggs, fruit                       Beverages:  How much water consumed (per day?): (P) 6             Cups of milk consumed (per day?): (P) 0                       Cups of  juice consumed (per day?): (P) 0       Number of supplement shakes (per day?): (P) 0                       Number of cups of coffee (per day?): (P) 0                                 Cups of soda consumed (per day?): (P) 0       Other non-alcoholic beverages consumed (per day?): (P) 2   Types of other beverages: (P) Crystal light          LIFESTYLE FACTORS    Dinning out: 1-2 x per week    Meal preparation/shopping: self     Sleep: fair, but getting better since started a small dose of hormones     Stress Level: low    Support System:  spouse    Exercise Regimen: walks for 35 minutes with her  everyday. She wants to add weight bearing exercises a few days a week      Diagnosis    Excessive energy intake related to not always eating every 3-4 hours as evidenced by 24 hour recall.      Intervention    Estimated Energy Requirements:   Calories: 1,800 kcal  Protein: 135 grams  Carbohydrates: 135 grams  Total Fat: 80 grams   Baseline for fluids: 90 oz + sweat loss    Recommendations & Goals:  Patient goals and recommendations are tailored to the specific patient's needs, readiness to change, lifestyle, culture, skills, resources, & abilities. Strategies to help achieve these nutrition-related goals were discussed which can include but are not limited to SMART goal setting & mindful eating.     Aim for a minimum of 7 hours sleep   Exercise 60 minutes most days  Eat breakfast within 1-2 hours of waking up  Try not to skip any meals or snacks, not going more than 3-4 hours without eating   At each meal and snack, try to include a source of fiber + lean protein + healthy fat     Written Materials Provided  These resources are intended to assist the patient in making it easier to choose recommended options when eating out & to identify better-for-you brands at the grocery store:    Meal Planning Guide with recommendations discussed along with portion sizes and a customized meal plan   Fueling Well On-the-Go Food Guide  Eat Fit Shopping Guide  Lifestyle Nutrition Meal Guide  RD contact information    Goals:   - Eat every 3-4 hours  - Increase water intake  - Exercise:  "Continue walking, but I love how you want to add more weight bearing exercises a few days per week  - Continue low carb at dinner time (6 oz lean protein + non-starchy veggies)  - Mexican: If having chips, put 1 handful on your plate and enjoy. Main entree would be fajita's with no tortillas (or have 1 tortilla if not having chips)  - Nuts: 1/4 cup nuts if having for a snack. If having after dinner, then 1/8 cup  -Keep snacks at work for busy days (Jiff to go PB cups, Landon's nut butter individual packets, protein shakes,   -"Josh # 7 Rule": Aisles of grocery stores; look for brands that have <7 grams added sugar and at least 7 grams protein or more (if protein bars and shakes aim for at least 20 grams protein)  - Deep fried food: Enjoy at most twice a month (or a 'fun/cheat' meal)  -Breakfast: Add up to 2 servings of carbs = 30-40 grams carbs for your breakfast and lunch (Refer to fruit page and starches & breads page of meal planning guide book to see what 1 serving of carbs = 15-20 grams is for portions  -Salads: Any non-starchy veggies + 6 oz lean protein + 2 fats each being ~2 tablespoons (dressing, cheese, nuts, avocado)  - 1 serving of whole wheat crackers -or- 1 piece of fruit with protein/fat (2 tablespoons PB, 2 oz cheese) is a great snack  - Try Simply Delish Keto pudding mix  - Try palmini hearts of palm 'pasta' (get it in a bag/pouch and not the can)  - Mix half brown rice (1/4 cup cooked portion) with riced cauliflower  - [employer] gourmet --> frozen pre made cauliflower 'pizza' crusts  -Try different carb alternatives with cottage cheese  -Take your Vitamin D3/Vitamin K every other day instead of every day since vitamin D in your capsule is 10,000 international units. Get your Vitamin D rechecked in about 3-4 months to see if we need to adjust dosage  -Reta: low carb bread found at [facility]  -NSA (no sugar added) or unsweetened jelly is great! (Polaner's All Fruit preserves is a great " brand)      Monitoring/Evaluation    Monitor the following:  Weight  Sleep  Stress Management  Movement  Nutrient intake in reference to meal plan    Communicated with healthcare provider and documented plan for referral to appropriate agency/healthcare provider as needed    Supervising Physician: Joe Dueñas MD    Patient motivation, anticipated barriers, expected compliance: Patient is motivated and has verbalized understanding and intent to comply.     Comprehension: good     Follow-up: 6-8 weeks

## 2025-02-06 NOTE — PATIENT INSTRUCTIONS
-Get back on the protein shakes. Even for convenience if you need to do the ready to drink protein shakes such as DogSpot Core Power 42 gram ready to drink protein drink, then that is fine in order to help preserve your lean muscle mass and metabolism.   - Nuts are a great snack (1/4 cup nuts = ~200 calories)  - Continue to increase water intake  - For your protein muffins, you can do half the amount of plain Greek yogurt and replace the other half with Vital Proteins Collagen Peptides  -I wouldn't do the Curbsy sugar free cake mix since it contains white carbs as well as dyes. Instead do Milford Cakes 100% whole grain flapjack mix or the Milford Cakes Carb conscious:   https://www.Pebble/ip/Milford-Cakes-Carb-Conscious-Flapjack-and-Waffle-Mix-12-oz/409527892

## 2025-02-10 ENCOUNTER — PATIENT MESSAGE (OUTPATIENT)
Dept: INTERNAL MEDICINE | Facility: CLINIC | Age: 55
End: 2025-02-10
Payer: COMMERCIAL

## 2025-02-10 NOTE — TELEPHONE ENCOUNTER
Patient has been closed out of  MTM program due to patient request.  Programs updated, medications discontinued, and future visits canceled. Pt to welcome to re-enroll in  MTM program at later date if they would like.

## 2025-03-12 RX ORDER — ONDANSETRON HYDROCHLORIDE 8 MG/1
8 TABLET, FILM COATED ORAL EVERY 8 HOURS PRN
Qty: 25 TABLET | Refills: 1 | Status: SHIPPED | OUTPATIENT
Start: 2025-03-12

## 2025-04-09 ENCOUNTER — CLINICAL SUPPORT (OUTPATIENT)
Dept: NUTRITION | Facility: CLINIC | Age: 55
End: 2025-04-09
Payer: COMMERCIAL

## 2025-04-09 VITALS — WEIGHT: 153 LBS | BODY MASS INDEX: 25.49 KG/M2 | HEIGHT: 65 IN

## 2025-04-09 DIAGNOSIS — Z71.3 DIETARY COUNSELING AND SURVEILLANCE: ICD-10-CM

## 2025-04-09 DIAGNOSIS — I10 BENIGN HYPERTENSION: ICD-10-CM

## 2025-04-09 DIAGNOSIS — E66.9 OBESITY (BMI 30-39.9): Primary | ICD-10-CM

## 2025-04-09 NOTE — PATIENT INSTRUCTIONS
-Instead of increasing cardio, do weight bearing exercises. Be sure to consume at least 20 grams protein within 1 hour post workout.     -Zevia: 0-calorie/0 artificial sweetener sweetener soft drink alternatives for your     -Magic Spoon now makes a protein granola    -Crisp Power: Pretzels (protein pretzels found at [facility])    -Magic Spoon protein treats (aka protein rice crispy treat like snacks)    -Amazon is great for trying different foods in variety packs    -Pure Protein makes protein brownies for a sweet treat    -Mix more riced cauliflower with a little brown rice    -Ex: Mix regular lasagna sheets with Palmini lasagna 'hearts of palm' sheets    -Classico Creamy José Miguel Sauce    -google 4 ingredient cottage cheese queso for recipe. Heat in microwave after you blend. Serve with Quest protein chips    -Siete almond flour tortillas (freezer section of grocery stores)    -Siete also makes grain free tortilla chips    -Reta blueberry bagels (low carb bagel at [facility])    -Eat fit options offered at [facility] (found on [facility] Eat Fit jorden)

## 2025-04-09 NOTE — PROGRESS NOTES
"Nutrition Assessment    Visit Type: employee wellness consult follow-up  Session Time:  37 minutes    Reason for MNT visit: Pt in for education and nutrition counseling regarding Obesity, goal of weight loss  The patient location is: work  The chief complaint leading to consultation is: goal of body fat loss    Visit type: audiovisual    Face to Face time with patient: 37 minutes   37 minutes  of total time spent on the encounter, which includes face to face time and non-face to face time preparing to see the patient (eg, review of tests), Obtaining and/or reviewing separately obtained history, Documenting clinical information in the electronic or other health record, Independently interpreting results (not separately reported) and communicating results to the patient/family/caregiver, or Care coordination (not separately reported).       Each patient to whom he or she provides medical services by telemedicine is:  (1) informed of the relationship between the physician and patient and the respective role of any other health care provider with respect to management of the patient; and (2) notified that he or she may decline to receive medical services by telemedicine and may withdraw from such care at any time.      Age: 55 y.o.  Wt:   Wt Readings from Last 1 Encounters:   04/09/25 69.4 kg (153 lb)     Ht:   Ht Readings from Last 1 Encounters:   04/09/25 5' 5" (1.651 m)     BMI:   BMI Readings from Last 1 Encounters:   04/09/25 25.46 kg/m²       Client states:      4.9.2025 virtual follow-up: She is down 34 pounds since she started October 29th, 2024. She is 153 pounds and wants to tone up. This is the lowest weight she has been since college. Since 2019 she is down 70 pounds in weight. She is keeping up with the hand weights, walking and vibration weight. She still keeps the big plate away and uses smaller plates at home    2.6.2025 virtual follow-up: She is down to 165 pounds. She stated she isn't happy with " [facility], but will continue taking Zepbound. Reports still struggling trying to eat every 3-4 hours due to Zepbound medication suppressing her appetite. Reports consuming more fiber, which has helped relieve her constipation. She has made homemade veggie soup with beans and beef. She tried the cottage cheese eggs and didn't care for it. She has been making protein muffins. She tried the Eat Fit pizza from [employer] pizza and loved it. She got off Lexapro and is doing fine and instead uses Nello SuperCalm packet mixes in her water, which she loves.    12.4.2024 virtual follow-up: On week 7 on Zepbound. She is down 9 pounds so far and is doing great. She did well over Thanksgiving. She is walking every day (minimum 5 days per week) for 45 minutes with her . She is working on increasing her protein intake     10.2.2024 virtual follow-up: She got in touch with [facility], and has a consult to see if she is approved in November. Reports walking more and walks a minimum of every other day if not every day for 35-40 minutes at a decent pace. Reports trying to eat better as a family. Her weight is 182.8 pounds, which is down from where she started off at 185 pounds. Her clothes are fitting looser. Since the family is on the plan it's been easier. Stated she tried some new brands and tried making a high protein dip using cottage cheese    8.21.2024: Last seen in January 2023. She and her  want to eat healthy and lose weight. She wants to watch her carb and sugar intake and discuss rewards on the weekend. Dealing with some menopause/hormone issues. Reports losing 55 pounds from [id number] and is slowly gaining the weight back since then. Thinking about joining [facility] program at [facility]. I highly encourage her to try it out and see how she likes it    Medical History  Problem List             Resolved    Atrophy of thyroid         Benign hypertension         Flank pain          Refractive error         Cervical spondylosis         Lumbar spondylosis         DDD (degenerative disc disease), cervical         DDD (degenerative disc disease), lumbar         Encounter for long-term (current) use of other medications            Past Medical History:   Diagnosis Date    Cervical spondylosis 2022    Hypertension     Hypothyroidism     Mitral valve prolapse        Past Surgical History:   Procedure Laterality Date     SECTION      CHOLECYSTECTOMY      EPIDURAL STEROID INJECTION Bilateral 10/26/2022    Procedure: Bilateral L5 Transforaminal Epidural Steroid Injections (ref: Floyd);  Surgeon: Juan Conrad Jr., MD;  Location: [facility];  Service: Pain Management;  Laterality: Bilateral;  @1249  No ATC or DM  Needs Consent    plantar fasciitis            Medications    Prior to Admission medications    Medication Sig Start Date End Date Taking? Authorizing Provider   EScitalopram oxalate (LEXAPRO) 10 MG tablet Take 1 tablet by mouth daily 3/25/24      estradioL (ESTRACE) 1 MG tablet Take 1 tablet (1 mg total) by mouth once daily. 24   Ras Oviedo MD   fluticasone propionate (FLONASE) 50 mcg/actuation nasal spray Use 1 spray in each nostril daily 24      hydrOXYzine HCL (ATARAX) 25 MG tablet Take 1 tablet (25 mg total) by mouth 3 (three) times daily as needed for Itching or Anxiety. 24   Ras Oviedo MD   levothyroxine (SYNTHROID) 100 MCG tablet Take 1 tablet by mouth once daily 3/19/24      meloxicam (MOBIC) 7.5 MG tablet Take 1 tablet by mouth daily 23      montelukast (SINGULAIR) 10 mg tablet TAKE 1 TABLET BY MOUTH EVERY EVENING 21   Janelle Medeiros MD   ondansetron (ZOFRAN) 4 MG tablet Take 1 tablet by mouth every 8 hours as needed for nausea/vomiting 23   Janelle Medeiros MD   tiZANidine (ZANAFLEX) 4 MG tablet Take 1 tablet by mouth at bedtime as needed for spasm 10/24/22      tiZANidine (ZANAFLEX) 4 MG tablet Take 1 tablet by  mouth at bedtime as needed for spasm 4/20/23           Food Allergies or Intolerances:  NKFA     Social History    Marital status:      Social History     Tobacco Use    Smoking status: Never    Smokeless tobacco: Never   Substance Use Topics    Alcohol use: Yes     Comment: rare     Current Alcohol use: every other month (1/2 glass of red wine or a low carb beer)    Lab Reports:  Reviewed and noted     Lab Results   Component Value Date    NQEOSENQ32FI 45 05/28/2024    TSH 1.982 01/08/2025    FREET4 1.08 05/28/2024    FREET4 1.08 05/28/2024    CRP 1.2 05/09/2022    SEDRATE 4 05/09/2022    AST 18 01/08/2025    ALT 20 01/08/2025    HDL 63 05/28/2024    HDL 63 05/28/2024    LDLCALC 118.8 05/28/2024    LDLCALC 118.8 05/28/2024    TRIG 106 05/28/2024    TRIG 106 05/28/2024    HGBA1C 5.1 05/28/2024         BP Readings from Last 1 Encounters:   01/08/25 134/76      24-hour Recall:      Food choices (After Midnight)  Patient eating midnight to 4am: (P) Never          Food choices (Early Morning)  Patient eats 4am to 8am: (P) Never                                 Food choices (Morning)  Patient eats 8am to noon: (P) Once or twice a week                 Fast food meals (8am - midnight): (P) none        Food choices (Afternoon)  Patient eats noon to 4pm: (P) Once or twice a week   Home cooked meals (Noon - 4pm): (P) Cheese, nuts, eggs, fruit, leftover grilled meats       Snacks (Noon to 4pm): (P) Protein Shakes        Food choices (Evening)         Patient eats 4pm to 8pm: (P) Every day          Home cooked meals (4pm - 8pm): (P) Grilled meats and veggies      Fast food meals (4pm - 8pm): (P) none           Snacks (4pm - 8pm): (P) Cheese, nuts, eggs, fruit         Food choices (Late evening)  R [facility] PEQ NUTRITION HOW OFTEN EATING LATE EVENING: (P) Once or twice a month   Home cooked meals (8pm - midnight): (P) Cheese, nuts, eggs, fruit, leftover grilled meats   Fast food meals (8pm - midnight): (P) none     Snacks (8pm  - midnight: (P) Protein Shakes, Cheese, nuts, eggs, fruit                       Beverages:  How much water consumed (per day?): (P) 6             Cups of milk consumed (per day?): (P) 0                       Cups of  juice consumed (per day?): (P) 0       Number of supplement shakes (per day?): (P) 0                       Number of cups of coffee (per day?): (P) 0                                Cups of soda consumed (per day?): (P) 0       Other non-alcoholic beverages consumed (per day?): (P) 2   Types of other beverages: (P) Crystal light          LIFESTYLE FACTORS    Dinning out: 1-2 x per week    Meal preparation/shopping: self     Sleep: fair, but getting better since started a small dose of hormones     Stress Level: low    Support System:  spouse    Exercise Regimen: walks for 35 minutes with her  everyday. She wants to add weight bearing exercises a few days a week      Diagnosis    Excessive energy intake related to not always eating every 3-4 hours as evidenced by 24 hour recall.      Intervention    Estimated Energy Requirements:   Calories: 1,800 kcal  Protein: 135 grams  Carbohydrates: 135 grams  Total Fat: 80 grams   Baseline for fluids: 90 oz + sweat loss    Recommendations & Goals:  Patient goals and recommendations are tailored to the specific patient's needs, readiness to change, lifestyle, culture, skills, resources, & abilities. Strategies to help achieve these nutrition-related goals were discussed which can include but are not limited to SMART goal setting & mindful eating.     Aim for a minimum of 7 hours sleep   Exercise 60 minutes most days  Eat breakfast within 1-2 hours of waking up  Try not to skip any meals or snacks, not going more than 3-4 hours without eating   At each meal and snack, try to include a source of fiber + lean protein + healthy fat     Written Materials Provided  These resources are intended to assist the patient in making it easier to choose recommended options  "when eating out & to identify better-for-you brands at the grocery store:    Meal Planning Guide with recommendations discussed along with portion sizes and a customized meal plan   Fueling Well On-the-Go Food Guide  Eat Fit Shopping Guide  Lifestyle Nutrition Meal Guide  RD contact information    Goals:   - Eat every 3-4 hours  - Increase water intake  - Exercise: Continue walking, but I love how you want to add more weight bearing exercises a few days per week  - Continue low carb at dinner time (6 oz lean protein + non-starchy veggies)  - Mexican: If having chips, put 1 handful on your plate and enjoy. Main entree would be fajita's with no tortillas (or have 1 tortilla if not having chips)  - Nuts: 1/4 cup nuts if having for a snack. If having after dinner, then 1/8 cup  -Keep snacks at work for busy days (Jiff to go PB cups, [employer] nut butter individual packets, protein shakes,   -"Josh # 7 Rule": Aisles of grocery stores; look for brands that have <7 grams added sugar and at least 7 grams protein or more (if protein bars and shakes aim for at least 20 grams protein)  - Deep fried food: Enjoy at most twice a month (or a 'fun/cheat' meal)  -Breakfast: Add up to 2 servings of carbs = 30-40 grams carbs for your breakfast and lunch (Refer to fruit page and starches & breads page of meal planning guide book to see what 1 serving of carbs = 15-20 grams is for portions  -Salads: Any non-starchy veggies + 6 oz lean protein + 2 fats each being ~2 tablespoons (dressing, cheese, nuts, avocado)  - 1 serving of whole wheat crackers -or- 1 piece of fruit with protein/fat (2 tablespoons PB, 2 oz cheese) is a great snack  - Try Simply Delish Keto pudding mix  - Try palmini hearts of palm 'pasta' (get it in a bag/pouch and not the can)  - Mix half brown rice (1/4 cup cooked portion) with riced cauliflower  - [employer] gourmet --> frozen pre made cauliflower 'pizza' crusts  -Try different carb alternatives with cottage " cheese  -Take your Vitamin D3/Vitamin K every other day instead of every day since vitamin D in your capsule is 10,000 international units. Get your Vitamin D rechecked in about 3-4 months to see if we need to adjust dosage  -Reta: low carb bread found at [facility] (no sugar added) or unsweetened jelly is great! ([employer]'s All Fruit preserves is a great brand)      Monitoring/Evaluation    Monitor the following:  Weight  Sleep  Stress Management  Movement  Nutrient intake in reference to meal plan    Communicated with healthcare provider and documented plan for referral to appropriate agency/healthcare provider as needed    Supervising Physician: Joe Dueñas MD    Patient motivation, anticipated barriers, expected compliance: Patient is motivated and has verbalized understanding and intent to comply.     Comprehension: good     Follow-up: 8 weeks

## 2025-04-30 ENCOUNTER — CLINICAL SUPPORT (OUTPATIENT)
Dept: OTHER | Facility: CLINIC | Age: 55
End: 2025-04-30

## 2025-04-30 DIAGNOSIS — Z00.8 ENCOUNTER FOR OTHER GENERAL EXAMINATION: ICD-10-CM

## 2025-05-01 VITALS
HEIGHT: 65 IN | SYSTOLIC BLOOD PRESSURE: 118 MMHG | WEIGHT: 149 LBS | DIASTOLIC BLOOD PRESSURE: 78 MMHG | BODY MASS INDEX: 24.83 KG/M2

## 2025-05-01 LAB
HDLC SERPL-MCNC: 47 MG/DL
POC CHOLESTEROL, LDL (DOCK): 120 MG/DL
POC CHOLESTEROL, TOTAL: 183 MG/DL
POC GLUCOSE, FASTING: 85 MG/DL (ref 60–110)
TRIGL SERPL-MCNC: 85 MG/DL

## 2025-05-12 ENCOUNTER — E-VISIT (OUTPATIENT)
Dept: FAMILY MEDICINE | Facility: CLINIC | Age: 55
End: 2025-05-12
Payer: COMMERCIAL

## 2025-05-12 VITALS
HEART RATE: 65 BPM | BODY MASS INDEX: 25.49 KG/M2 | HEIGHT: 65 IN | DIASTOLIC BLOOD PRESSURE: 71 MMHG | WEIGHT: 153 LBS | SYSTOLIC BLOOD PRESSURE: 131 MMHG

## 2025-05-12 DIAGNOSIS — I10 BENIGN HYPERTENSION: ICD-10-CM

## 2025-05-12 DIAGNOSIS — E66.9 OBESITY, UNSPECIFIED CLASS, UNSPECIFIED OBESITY TYPE, UNSPECIFIED WHETHER SERIOUS COMORBIDITY PRESENT: Primary | ICD-10-CM

## 2025-05-12 DIAGNOSIS — E03.9 ACQUIRED HYPOTHYROIDISM: ICD-10-CM

## 2025-05-12 NOTE — PROGRESS NOTES
Patient ID: Faith Johnson is a 55 y.o. female.    Chief Complaint: Weight Loss (Entered automatically based on patient selection in [employer].)    The patient initiated a request through [employer] on 5/12/2025 for evaluation and management with a chief complaint of Weight Loss (Entered automatically based on patient selection in [employer].)     I evaluated the questionnaire submission on 05/12/2025  .    [facility] Peq Evisit Weight Management    5/12/2025  1:25 PM CDT - Filed by Patient   Do you agree to participate in an E-Visit? Yes   If you have any of the following symptoms, please present to your local emergency room or call 911: I acknowledge   Medication requests for narcotics will not be addressed via an E-Visit.  Please schedule an appointment. I acknowledge   Choose the state of your primary residence [place]   Do you have any of the following pregnancy-related conditions? None   What best describes your appetite? Average   Do you have specific food cravings? No   When you eat, how quickly do you feel full No change   Give an example of what you have eaten in a day in the past 2 weeks:    Breakfast: Protein Waffles or Eggs   Lunch: Salad, Ham [place] or nothing   Dinner: Chicken or Beef, rice, salad, vegetable   Snacks: Apple   Drinks: water, unsweet tea   Have you exercised in the past week? Yes   What type of exercise? Walking   How many days in a week do you exercise? 3   How many minutes do you exericse? 30   Do you have a personal or family history of thyroid cancer? No   Do you have a personal history of kidney stones? No   Do you have a personal history of seizures? No   Do you have a personal history of pancreatitis? No   I would like to address: Medication for weight loss   Do you want to address a new or existing medication? I would like to address a medication I currently take   Would you like to change or continue your medication? Continue medication   What medication would you like to continue?   Tirzepatide   Are you taking it as prescribed? Yes    What medical condition is the  medication intended to treat? Weight loss   Is the medication helping your condition? Yes   Are you having any side effects from the medication? No   Provide any additional information you feel is important. None at this time.   Please attach any relevant images or files    Are you able to take your vital signs? Yes   Systolic Blood Pressure: 131   Diastolic Blood Pressure: 71   Weight: 153   Height: 65   Pulse: 81   Temperature:    Respiration rate:    Pulse Oxygen:          Encounter Diagnoses   Name Primary?    Obesity, unspecified class, unspecified obesity type, unspecified whether serious comorbidity present Yes    Acquired hypothyroidism     Benign hypertension         No orders of the defined types were placed in this encounter.       Can't get the zepbound for reasonable cost >300 dollars a month  Has done really well  Get her the tirzepatide with the b12  Compounded helps with nausea side effects and fatigue.  Medically necessary to continue    No follow-ups on file.      E-Visit Time Tracking:    Day 1 Time (in minutes): 11    Total Time (in minutes): 11

## 2025-05-15 DIAGNOSIS — J30.9 ALLERGIC RHINITIS, UNSPECIFIED SEASONALITY, UNSPECIFIED TRIGGER: ICD-10-CM

## 2025-05-15 RX ORDER — FLUTICASONE PROPIONATE 50 MCG
1 SPRAY, SUSPENSION (ML) NASAL
Qty: 16 G | Refills: 2 | Status: SHIPPED | OUTPATIENT
Start: 2025-05-15

## 2025-05-15 NOTE — TELEPHONE ENCOUNTER
No care due was identified.  [facility] Embedded Care Due Messages. Reference number: [id number].   5/15/2025 9:09:42 AM CDT

## 2025-05-20 ENCOUNTER — PATIENT MESSAGE (OUTPATIENT)
Dept: ADMINISTRATIVE | Facility: HOSPITAL | Age: 55
End: 2025-05-20
Payer: COMMERCIAL